# Patient Record
Sex: FEMALE | Race: WHITE | NOT HISPANIC OR LATINO
[De-identification: names, ages, dates, MRNs, and addresses within clinical notes are randomized per-mention and may not be internally consistent; named-entity substitution may affect disease eponyms.]

---

## 2017-01-05 ENCOUNTER — APPOINTMENT (OUTPATIENT)
Dept: HEMATOLOGY ONCOLOGY | Facility: CLINIC | Age: 67
End: 2017-01-05

## 2017-01-05 ENCOUNTER — APPOINTMENT (OUTPATIENT)
Dept: INFUSION THERAPY | Facility: CLINIC | Age: 67
End: 2017-01-05

## 2017-01-05 VITALS
TEMPERATURE: 98.6 F | DIASTOLIC BLOOD PRESSURE: 79 MMHG | HEIGHT: 64 IN | BODY MASS INDEX: 22.2 KG/M2 | HEART RATE: 76 BPM | SYSTOLIC BLOOD PRESSURE: 141 MMHG | WEIGHT: 130 LBS

## 2017-01-17 ENCOUNTER — RX RENEWAL (OUTPATIENT)
Age: 67
End: 2017-01-17

## 2017-02-16 ENCOUNTER — APPOINTMENT (OUTPATIENT)
Dept: INFUSION THERAPY | Facility: CLINIC | Age: 67
End: 2017-02-16

## 2017-02-16 ENCOUNTER — APPOINTMENT (OUTPATIENT)
Dept: HEMATOLOGY ONCOLOGY | Facility: CLINIC | Age: 67
End: 2017-02-16

## 2017-02-16 VITALS
HEART RATE: 88 BPM | WEIGHT: 128 LBS | HEIGHT: 64 IN | SYSTOLIC BLOOD PRESSURE: 126 MMHG | DIASTOLIC BLOOD PRESSURE: 61 MMHG | RESPIRATION RATE: 14 BRPM | TEMPERATURE: 96.6 F | BODY MASS INDEX: 21.85 KG/M2

## 2017-02-16 RX ORDER — OXYCODONE 5 MG/1
5 TABLET ORAL EVERY 6 HOURS
Qty: 120 | Refills: 0 | Status: ACTIVE | COMMUNITY
Start: 2017-02-16 | End: 1900-01-01

## 2017-03-02 ENCOUNTER — APPOINTMENT (OUTPATIENT)
Dept: HEMATOLOGY ONCOLOGY | Facility: CLINIC | Age: 67
End: 2017-03-02

## 2017-03-02 ENCOUNTER — APPOINTMENT (OUTPATIENT)
Dept: INFUSION THERAPY | Facility: CLINIC | Age: 67
End: 2017-03-02

## 2017-03-16 ENCOUNTER — APPOINTMENT (OUTPATIENT)
Dept: INFUSION THERAPY | Facility: CLINIC | Age: 67
End: 2017-03-16

## 2017-03-16 ENCOUNTER — APPOINTMENT (OUTPATIENT)
Dept: HEMATOLOGY ONCOLOGY | Facility: CLINIC | Age: 67
End: 2017-03-16

## 2017-03-16 VITALS
RESPIRATION RATE: 14 BRPM | SYSTOLIC BLOOD PRESSURE: 152 MMHG | WEIGHT: 130 LBS | HEART RATE: 88 BPM | DIASTOLIC BLOOD PRESSURE: 69 MMHG | BODY MASS INDEX: 22.2 KG/M2 | TEMPERATURE: 97.2 F | HEIGHT: 64 IN

## 2017-03-30 ENCOUNTER — APPOINTMENT (OUTPATIENT)
Dept: INFUSION THERAPY | Facility: CLINIC | Age: 67
End: 2017-03-30

## 2017-03-30 ENCOUNTER — APPOINTMENT (OUTPATIENT)
Dept: HEMATOLOGY ONCOLOGY | Facility: CLINIC | Age: 67
End: 2017-03-30

## 2017-03-31 ENCOUNTER — RX RENEWAL (OUTPATIENT)
Age: 67
End: 2017-03-31

## 2017-04-13 ENCOUNTER — APPOINTMENT (OUTPATIENT)
Dept: HEMATOLOGY ONCOLOGY | Facility: CLINIC | Age: 67
End: 2017-04-13

## 2017-04-13 ENCOUNTER — APPOINTMENT (OUTPATIENT)
Dept: INFUSION THERAPY | Facility: CLINIC | Age: 67
End: 2017-04-13

## 2017-04-13 VITALS
RESPIRATION RATE: 14 BRPM | TEMPERATURE: 97.9 F | HEART RATE: 72 BPM | SYSTOLIC BLOOD PRESSURE: 152 MMHG | WEIGHT: 129 LBS | DIASTOLIC BLOOD PRESSURE: 72 MMHG | HEIGHT: 64 IN | BODY MASS INDEX: 22.02 KG/M2

## 2017-05-11 ENCOUNTER — APPOINTMENT (OUTPATIENT)
Dept: HEMATOLOGY ONCOLOGY | Facility: CLINIC | Age: 67
End: 2017-05-11

## 2017-05-11 ENCOUNTER — APPOINTMENT (OUTPATIENT)
Dept: INFUSION THERAPY | Facility: CLINIC | Age: 67
End: 2017-05-11

## 2017-05-11 VITALS
SYSTOLIC BLOOD PRESSURE: 152 MMHG | HEART RATE: 80 BPM | RESPIRATION RATE: 14 BRPM | DIASTOLIC BLOOD PRESSURE: 70 MMHG | TEMPERATURE: 99.5 F

## 2017-05-18 ENCOUNTER — RX RENEWAL (OUTPATIENT)
Age: 67
End: 2017-05-18

## 2017-06-05 ENCOUNTER — APPOINTMENT (OUTPATIENT)
Dept: HEMATOLOGY ONCOLOGY | Facility: CLINIC | Age: 67
End: 2017-06-05

## 2017-06-05 ENCOUNTER — OUTPATIENT (OUTPATIENT)
Dept: OUTPATIENT SERVICES | Facility: HOSPITAL | Age: 67
LOS: 1 days | Discharge: HOME | End: 2017-06-05

## 2017-06-05 ENCOUNTER — APPOINTMENT (OUTPATIENT)
Dept: INFUSION THERAPY | Facility: CLINIC | Age: 67
End: 2017-06-05

## 2017-06-05 VITALS
HEART RATE: 111 BPM | SYSTOLIC BLOOD PRESSURE: 161 MMHG | RESPIRATION RATE: 14 BRPM | DIASTOLIC BLOOD PRESSURE: 79 MMHG | TEMPERATURE: 99.3 F | HEIGHT: 64 IN

## 2017-06-05 VITALS — BODY MASS INDEX: 21.63 KG/M2 | WEIGHT: 126 LBS

## 2017-06-08 ENCOUNTER — APPOINTMENT (OUTPATIENT)
Dept: HEMATOLOGY ONCOLOGY | Facility: CLINIC | Age: 67
End: 2017-06-08

## 2017-06-20 ENCOUNTER — OUTPATIENT (OUTPATIENT)
Dept: OUTPATIENT SERVICES | Facility: HOSPITAL | Age: 67
LOS: 1 days | Discharge: HOME | End: 2017-06-20

## 2017-06-28 DIAGNOSIS — C50.919 MALIGNANT NEOPLASM OF UNSPECIFIED SITE OF UNSPECIFIED FEMALE BREAST: ICD-10-CM

## 2017-07-06 ENCOUNTER — APPOINTMENT (OUTPATIENT)
Dept: HEMATOLOGY ONCOLOGY | Facility: CLINIC | Age: 67
End: 2017-07-06

## 2017-07-06 ENCOUNTER — APPOINTMENT (OUTPATIENT)
Dept: INFUSION THERAPY | Facility: CLINIC | Age: 67
End: 2017-07-06

## 2017-07-07 DIAGNOSIS — C78.89 SECONDARY MALIGNANT NEOPLASM OF OTHER DIGESTIVE ORGANS: ICD-10-CM

## 2017-07-07 DIAGNOSIS — C79.51 SECONDARY MALIGNANT NEOPLASM OF BONE: ICD-10-CM

## 2017-07-07 DIAGNOSIS — Z85.3 PERSONAL HISTORY OF MALIGNANT NEOPLASM OF BREAST: ICD-10-CM

## 2017-07-18 ENCOUNTER — OTHER (OUTPATIENT)
Age: 67
End: 2017-07-18

## 2017-07-25 ENCOUNTER — RX RENEWAL (OUTPATIENT)
Age: 67
End: 2017-07-25

## 2017-08-03 ENCOUNTER — APPOINTMENT (OUTPATIENT)
Dept: INFUSION THERAPY | Facility: CLINIC | Age: 67
End: 2017-08-03

## 2017-08-03 ENCOUNTER — APPOINTMENT (OUTPATIENT)
Dept: HEMATOLOGY ONCOLOGY | Facility: CLINIC | Age: 67
End: 2017-08-03

## 2017-08-03 VITALS
RESPIRATION RATE: 14 BRPM | HEIGHT: 64 IN | HEART RATE: 91 BPM | TEMPERATURE: 98.9 F | DIASTOLIC BLOOD PRESSURE: 84 MMHG | SYSTOLIC BLOOD PRESSURE: 146 MMHG

## 2017-08-03 VITALS — WEIGHT: 125 LBS | BODY MASS INDEX: 21.46 KG/M2

## 2017-08-03 RX ORDER — SUCRALFATE 1 G/10ML
1 SUSPENSION ORAL
Refills: 0 | Status: DISCONTINUED | COMMUNITY
End: 2017-08-03

## 2017-08-03 RX ORDER — EVEROLIMUS 10 MG/1
10 TABLET ORAL
Qty: 28 | Refills: 1 | Status: DISCONTINUED | COMMUNITY
Start: 2017-05-18 | End: 2017-08-03

## 2017-08-16 ENCOUNTER — RX RENEWAL (OUTPATIENT)
Age: 67
End: 2017-08-16

## 2017-08-25 ENCOUNTER — OTHER (OUTPATIENT)
Age: 67
End: 2017-08-25

## 2017-08-25 DIAGNOSIS — Z00.00 ENCOUNTER FOR GENERAL ADULT MEDICAL EXAMINATION W/OUT ABNORMAL FINDINGS: ICD-10-CM

## 2017-08-31 ENCOUNTER — APPOINTMENT (OUTPATIENT)
Dept: INFUSION THERAPY | Facility: CLINIC | Age: 67
End: 2017-08-31

## 2017-09-06 ENCOUNTER — RX RENEWAL (OUTPATIENT)
Age: 67
End: 2017-09-06

## 2017-09-18 ENCOUNTER — RX RENEWAL (OUTPATIENT)
Age: 67
End: 2017-09-18

## 2017-09-21 ENCOUNTER — APPOINTMENT (OUTPATIENT)
Dept: HEMATOLOGY ONCOLOGY | Facility: CLINIC | Age: 67
End: 2017-09-21

## 2017-09-21 VITALS
RESPIRATION RATE: 14 BRPM | HEART RATE: 115 BPM | WEIGHT: 122 LBS | SYSTOLIC BLOOD PRESSURE: 165 MMHG | BODY MASS INDEX: 20.83 KG/M2 | HEIGHT: 64 IN | DIASTOLIC BLOOD PRESSURE: 88 MMHG | TEMPERATURE: 100 F

## 2017-09-28 ENCOUNTER — APPOINTMENT (OUTPATIENT)
Dept: HEMATOLOGY ONCOLOGY | Facility: CLINIC | Age: 67
End: 2017-09-28

## 2017-09-28 ENCOUNTER — OUTPATIENT (OUTPATIENT)
Dept: OUTPATIENT SERVICES | Facility: HOSPITAL | Age: 67
LOS: 1 days | Discharge: HOME | End: 2017-09-28

## 2017-09-28 ENCOUNTER — APPOINTMENT (OUTPATIENT)
Dept: INFUSION THERAPY | Facility: CLINIC | Age: 67
End: 2017-09-28

## 2017-09-28 VITALS
HEIGHT: 64 IN | BODY MASS INDEX: 21.17 KG/M2 | TEMPERATURE: 98 F | WEIGHT: 124 LBS | DIASTOLIC BLOOD PRESSURE: 70 MMHG | HEART RATE: 92 BPM | RESPIRATION RATE: 16 BRPM | SYSTOLIC BLOOD PRESSURE: 168 MMHG

## 2017-09-28 DIAGNOSIS — C79.51 SECONDARY MALIGNANT NEOPLASM OF BONE: ICD-10-CM

## 2017-09-28 DIAGNOSIS — C78.89 SECONDARY MALIGNANT NEOPLASM OF OTHER DIGESTIVE ORGANS: ICD-10-CM

## 2017-09-28 DIAGNOSIS — Z85.3 PERSONAL HISTORY OF MALIGNANT NEOPLASM OF BREAST: ICD-10-CM

## 2017-10-03 ENCOUNTER — OUTPATIENT (OUTPATIENT)
Dept: OUTPATIENT SERVICES | Facility: HOSPITAL | Age: 67
LOS: 1 days | Discharge: HOME | End: 2017-10-03

## 2017-10-11 ENCOUNTER — OTHER (OUTPATIENT)
Age: 67
End: 2017-10-11

## 2017-10-12 DIAGNOSIS — I97.2 POSTMASTECTOMY LYMPHEDEMA SYNDROME: ICD-10-CM

## 2017-10-16 ENCOUNTER — APPOINTMENT (OUTPATIENT)
Dept: HEMATOLOGY ONCOLOGY | Facility: CLINIC | Age: 67
End: 2017-10-16

## 2017-10-17 ENCOUNTER — APPOINTMENT (OUTPATIENT)
Dept: HEMATOLOGY ONCOLOGY | Facility: CLINIC | Age: 67
End: 2017-10-17

## 2017-10-17 RX ORDER — EXEMESTANE 25 MG/1
25 TABLET, FILM COATED ORAL DAILY
Qty: 90 | Refills: 3 | Status: DISCONTINUED | COMMUNITY
Start: 2017-06-15 | End: 2017-10-17

## 2017-10-17 RX ORDER — EVEROLIMUS 7.5 MG/1
7.5 TABLET ORAL
Qty: 30 | Refills: 0 | Status: DISCONTINUED | COMMUNITY
Start: 2017-01-05 | End: 2017-10-17

## 2017-10-26 ENCOUNTER — APPOINTMENT (OUTPATIENT)
Dept: HEMATOLOGY ONCOLOGY | Facility: CLINIC | Age: 67
End: 2017-10-26

## 2017-10-26 ENCOUNTER — APPOINTMENT (OUTPATIENT)
Dept: INFUSION THERAPY | Facility: CLINIC | Age: 67
End: 2017-10-26

## 2017-11-22 ENCOUNTER — APPOINTMENT (OUTPATIENT)
Dept: HEMATOLOGY ONCOLOGY | Facility: CLINIC | Age: 67
End: 2017-11-22

## 2017-11-22 ENCOUNTER — APPOINTMENT (OUTPATIENT)
Dept: INFUSION THERAPY | Facility: CLINIC | Age: 67
End: 2017-11-22

## 2017-11-22 VITALS
TEMPERATURE: 99.6 F | DIASTOLIC BLOOD PRESSURE: 72 MMHG | HEART RATE: 97 BPM | HEIGHT: 64 IN | RESPIRATION RATE: 14 BRPM | SYSTOLIC BLOOD PRESSURE: 156 MMHG

## 2017-11-22 VITALS — BODY MASS INDEX: 22.14 KG/M2 | WEIGHT: 129 LBS

## 2017-12-21 ENCOUNTER — APPOINTMENT (OUTPATIENT)
Dept: INFUSION THERAPY | Facility: CLINIC | Age: 67
End: 2017-12-21

## 2017-12-21 ENCOUNTER — APPOINTMENT (OUTPATIENT)
Dept: HEMATOLOGY ONCOLOGY | Facility: CLINIC | Age: 67
End: 2017-12-21

## 2017-12-21 VITALS
TEMPERATURE: 96.5 F | RESPIRATION RATE: 14 BRPM | DIASTOLIC BLOOD PRESSURE: 79 MMHG | BODY MASS INDEX: 20.66 KG/M2 | WEIGHT: 121 LBS | HEIGHT: 64 IN | HEART RATE: 82 BPM | SYSTOLIC BLOOD PRESSURE: 198 MMHG

## 2018-01-03 ENCOUNTER — OTHER (OUTPATIENT)
Age: 68
End: 2018-01-03

## 2018-01-10 ENCOUNTER — RX RENEWAL (OUTPATIENT)
Age: 68
End: 2018-01-10

## 2018-01-18 ENCOUNTER — APPOINTMENT (OUTPATIENT)
Dept: HEMATOLOGY ONCOLOGY | Facility: CLINIC | Age: 68
End: 2018-01-18

## 2018-01-18 ENCOUNTER — OUTPATIENT (OUTPATIENT)
Dept: OUTPATIENT SERVICES | Facility: HOSPITAL | Age: 68
LOS: 1 days | Discharge: HOME | End: 2018-01-18

## 2018-01-18 ENCOUNTER — APPOINTMENT (OUTPATIENT)
Dept: INFUSION THERAPY | Facility: CLINIC | Age: 68
End: 2018-01-18

## 2018-01-18 VITALS
DIASTOLIC BLOOD PRESSURE: 82 MMHG | HEIGHT: 64 IN | HEART RATE: 89 BPM | SYSTOLIC BLOOD PRESSURE: 190 MMHG | TEMPERATURE: 96.4 F | RESPIRATION RATE: 14 BRPM

## 2018-01-18 VITALS — WEIGHT: 125 LBS | BODY MASS INDEX: 21.46 KG/M2

## 2018-01-18 DIAGNOSIS — Z85.3 PERSONAL HISTORY OF MALIGNANT NEOPLASM OF BREAST: ICD-10-CM

## 2018-01-18 DIAGNOSIS — C79.51 SECONDARY MALIGNANT NEOPLASM OF BONE: ICD-10-CM

## 2018-01-18 DIAGNOSIS — C78.89 SECONDARY MALIGNANT NEOPLASM OF OTHER DIGESTIVE ORGANS: ICD-10-CM

## 2018-01-19 RX ORDER — MEGESTROL ACETATE 40 MG/ML
40 SUSPENSION ORAL DAILY
Qty: 300 | Refills: 0 | Status: ACTIVE | COMMUNITY
Start: 2018-01-19 | End: 1900-01-01

## 2018-01-23 ENCOUNTER — OUTPATIENT (OUTPATIENT)
Dept: OUTPATIENT SERVICES | Facility: HOSPITAL | Age: 68
LOS: 1 days | Discharge: HOME | End: 2018-01-23

## 2018-01-23 DIAGNOSIS — C80.1 MALIGNANT (PRIMARY) NEOPLASM, UNSPECIFIED: ICD-10-CM

## 2018-02-15 ENCOUNTER — APPOINTMENT (OUTPATIENT)
Dept: HEMATOLOGY ONCOLOGY | Facility: CLINIC | Age: 68
End: 2018-02-15

## 2018-02-15 ENCOUNTER — APPOINTMENT (OUTPATIENT)
Dept: INFUSION THERAPY | Facility: CLINIC | Age: 68
End: 2018-02-15

## 2018-02-15 VITALS
HEART RATE: 102 BPM | SYSTOLIC BLOOD PRESSURE: 161 MMHG | HEIGHT: 64 IN | TEMPERATURE: 99.5 F | BODY MASS INDEX: 24.07 KG/M2 | RESPIRATION RATE: 14 BRPM | DIASTOLIC BLOOD PRESSURE: 85 MMHG | WEIGHT: 141 LBS

## 2018-02-15 RX ORDER — DENOSUMAB 60 MG/ML
1.7 INJECTION SUBCUTANEOUS ONCE
Qty: 0 | Refills: 0 | Status: DISCONTINUED | OUTPATIENT
Start: 2018-02-15 | End: 2018-02-15

## 2018-02-15 RX ORDER — DENOSUMAB 60 MG/ML
120 INJECTION SUBCUTANEOUS ONCE
Qty: 0 | Refills: 0 | Status: COMPLETED | OUTPATIENT
Start: 2018-02-15 | End: 2018-03-15

## 2018-02-15 RX ORDER — DENOSUMAB 60 MG/ML
120 INJECTION SUBCUTANEOUS ONCE
Qty: 0 | Refills: 0 | Status: DISCONTINUED | OUTPATIENT
Start: 2018-02-15 | End: 2018-02-15

## 2018-02-15 RX ORDER — ALPRAZOLAM 0.5 MG/1
0.5 TABLET ORAL
Qty: 30 | Refills: 0 | Status: ACTIVE | COMMUNITY
Start: 2018-02-15 | End: 1900-01-01

## 2018-02-21 ENCOUNTER — OUTPATIENT (OUTPATIENT)
Dept: OUTPATIENT SERVICES | Facility: HOSPITAL | Age: 68
LOS: 1 days | Discharge: HOME | End: 2018-02-21

## 2018-02-21 DIAGNOSIS — C79.51 SECONDARY MALIGNANT NEOPLASM OF BONE: ICD-10-CM

## 2018-02-23 DIAGNOSIS — I97.2 POSTMASTECTOMY LYMPHEDEMA SYNDROME: ICD-10-CM

## 2018-03-15 ENCOUNTER — APPOINTMENT (OUTPATIENT)
Dept: INFUSION THERAPY | Facility: CLINIC | Age: 68
End: 2018-03-15

## 2018-03-15 ENCOUNTER — APPOINTMENT (OUTPATIENT)
Dept: HEMATOLOGY ONCOLOGY | Facility: CLINIC | Age: 68
End: 2018-03-15

## 2018-03-15 VITALS
SYSTOLIC BLOOD PRESSURE: 166 MMHG | HEART RATE: 102 BPM | WEIGHT: 141 LBS | DIASTOLIC BLOOD PRESSURE: 86 MMHG | BODY MASS INDEX: 24.07 KG/M2 | HEIGHT: 64 IN | TEMPERATURE: 97 F

## 2018-03-15 DIAGNOSIS — M54.9 DORSALGIA, UNSPECIFIED: ICD-10-CM

## 2018-03-15 RX ORDER — OMEPRAZOLE 40 MG/1
40 CAPSULE, DELAYED RELEASE ORAL
Qty: 90 | Refills: 1 | Status: ACTIVE | COMMUNITY
Start: 1900-01-01 | End: 1900-01-01

## 2018-03-15 RX ORDER — DENOSUMAB 60 MG/ML
120 INJECTION SUBCUTANEOUS ONCE
Qty: 0 | Refills: 0 | Status: COMPLETED | OUTPATIENT
Start: 2018-03-15 | End: 2018-03-15

## 2018-03-15 RX ADMIN — DENOSUMAB 120 MILLIGRAM(S): 60 INJECTION SUBCUTANEOUS at 13:57

## 2018-03-15 RX ADMIN — DENOSUMAB 120 MILLIGRAM(S): 60 INJECTION SUBCUTANEOUS at 11:26

## 2018-04-10 ENCOUNTER — APPOINTMENT (OUTPATIENT)
Dept: HEMATOLOGY ONCOLOGY | Facility: CLINIC | Age: 68
End: 2018-04-10

## 2018-04-10 ENCOUNTER — APPOINTMENT (OUTPATIENT)
Dept: INFUSION THERAPY | Facility: CLINIC | Age: 68
End: 2018-04-10

## 2018-04-10 VITALS
TEMPERATURE: 99.7 F | HEIGHT: 64 IN | SYSTOLIC BLOOD PRESSURE: 186 MMHG | HEART RATE: 96 BPM | WEIGHT: 144 LBS | DIASTOLIC BLOOD PRESSURE: 79 MMHG | BODY MASS INDEX: 24.59 KG/M2

## 2018-04-10 DIAGNOSIS — I89.0 LYMPHEDEMA, NOT ELSEWHERE CLASSIFIED: ICD-10-CM

## 2018-04-10 RX ORDER — ONDANSETRON 8 MG/1
8 TABLET ORAL EVERY 8 HOURS
Qty: 40 | Refills: 4 | Status: ACTIVE | COMMUNITY
Start: 2017-02-16 | End: 1900-01-01

## 2018-04-10 RX ORDER — DENOSUMAB 60 MG/ML
120 INJECTION SUBCUTANEOUS ONCE
Qty: 0 | Refills: 0 | Status: COMPLETED | OUTPATIENT
Start: 2018-04-10 | End: 2018-04-10

## 2018-04-10 RX ADMIN — DENOSUMAB 120 MILLIGRAM(S): 60 INJECTION SUBCUTANEOUS at 11:24

## 2018-04-13 PROBLEM — I89.0 LYMPHEDEMA OF ARM: Status: ACTIVE | Noted: 2017-09-22

## 2018-05-09 ENCOUNTER — APPOINTMENT (OUTPATIENT)
Dept: INFUSION THERAPY | Facility: CLINIC | Age: 68
End: 2018-05-09

## 2018-05-09 ENCOUNTER — APPOINTMENT (OUTPATIENT)
Dept: HEMATOLOGY ONCOLOGY | Facility: CLINIC | Age: 68
End: 2018-05-09

## 2018-05-09 VITALS — WEIGHT: 140 LBS | BODY MASS INDEX: 24.03 KG/M2

## 2018-05-09 VITALS
DIASTOLIC BLOOD PRESSURE: 81 MMHG | TEMPERATURE: 99.8 F | SYSTOLIC BLOOD PRESSURE: 172 MMHG | RESPIRATION RATE: 16 BRPM | HEIGHT: 64 IN | HEART RATE: 102 BPM

## 2018-05-09 RX ORDER — DENOSUMAB 60 MG/ML
120 INJECTION SUBCUTANEOUS ONCE
Qty: 0 | Refills: 0 | Status: COMPLETED | OUTPATIENT
Start: 2018-05-09 | End: 2018-05-09

## 2018-05-09 RX ADMIN — DENOSUMAB 120 MILLIGRAM(S): 60 INJECTION SUBCUTANEOUS at 11:06

## 2018-05-10 ENCOUNTER — APPOINTMENT (OUTPATIENT)
Dept: INFUSION THERAPY | Facility: CLINIC | Age: 68
End: 2018-05-10

## 2018-05-10 ENCOUNTER — APPOINTMENT (OUTPATIENT)
Dept: HEMATOLOGY ONCOLOGY | Facility: CLINIC | Age: 68
End: 2018-05-10

## 2018-05-18 ENCOUNTER — LABORATORY RESULT (OUTPATIENT)
Age: 68
End: 2018-05-18

## 2018-05-18 ENCOUNTER — OUTPATIENT (OUTPATIENT)
Dept: OUTPATIENT SERVICES | Facility: HOSPITAL | Age: 68
LOS: 1 days | Discharge: HOME | End: 2018-05-18

## 2018-05-18 DIAGNOSIS — C50.919 MALIGNANT NEOPLASM OF UNSPECIFIED SITE OF UNSPECIFIED FEMALE BREAST: ICD-10-CM

## 2018-06-06 ENCOUNTER — APPOINTMENT (OUTPATIENT)
Dept: INFUSION THERAPY | Facility: CLINIC | Age: 68
End: 2018-06-06

## 2018-06-06 ENCOUNTER — APPOINTMENT (OUTPATIENT)
Dept: HEMATOLOGY ONCOLOGY | Facility: CLINIC | Age: 68
End: 2018-06-06

## 2018-06-06 VITALS
HEIGHT: 64 IN | SYSTOLIC BLOOD PRESSURE: 183 MMHG | TEMPERATURE: 99.3 F | WEIGHT: 139 LBS | BODY MASS INDEX: 23.73 KG/M2 | DIASTOLIC BLOOD PRESSURE: 77 MMHG | HEART RATE: 103 BPM

## 2018-06-06 RX ORDER — SUCRALFATE 1 G/10ML
1 SUSPENSION ORAL
Refills: 0 | Status: ACTIVE | COMMUNITY

## 2018-06-06 RX ORDER — DENOSUMAB 60 MG/ML
120 INJECTION SUBCUTANEOUS ONCE
Qty: 0 | Refills: 0 | Status: COMPLETED | OUTPATIENT
Start: 2018-06-06 | End: 2018-06-06

## 2018-06-06 RX ADMIN — DENOSUMAB 120 MILLIGRAM(S): 60 INJECTION SUBCUTANEOUS at 11:41

## 2018-07-03 ENCOUNTER — APPOINTMENT (OUTPATIENT)
Dept: HEMATOLOGY ONCOLOGY | Facility: CLINIC | Age: 68
End: 2018-07-03

## 2018-07-03 ENCOUNTER — APPOINTMENT (OUTPATIENT)
Dept: INFUSION THERAPY | Facility: CLINIC | Age: 68
End: 2018-07-03

## 2018-07-03 VITALS
HEIGHT: 64 IN | SYSTOLIC BLOOD PRESSURE: 181 MMHG | DIASTOLIC BLOOD PRESSURE: 86 MMHG | TEMPERATURE: 98.6 F | HEART RATE: 115 BPM

## 2018-07-03 DIAGNOSIS — Z51.81 ENCOUNTER FOR THERAPEUTIC DRUG LVL MONITORING: ICD-10-CM

## 2018-07-03 DIAGNOSIS — Z79.899 ENCOUNTER FOR THERAPEUTIC DRUG LVL MONITORING: ICD-10-CM

## 2018-07-03 RX ORDER — DENOSUMAB 60 MG/ML
120 INJECTION SUBCUTANEOUS ONCE
Qty: 0 | Refills: 0 | Status: COMPLETED | OUTPATIENT
Start: 2018-07-03 | End: 2018-07-03

## 2018-07-03 RX ORDER — GABAPENTIN 300 MG/1
300 CAPSULE ORAL 3 TIMES DAILY
Qty: 180 | Refills: 2 | Status: ACTIVE | COMMUNITY
Start: 2017-02-16 | End: 1900-01-01

## 2018-07-03 RX ORDER — MEGESTROL ACETATE 40 MG/1
40 TABLET ORAL EVERY 6 HOURS
Qty: 120 | Refills: 2 | Status: ACTIVE | COMMUNITY
Start: 2018-01-18 | End: 1900-01-01

## 2018-07-03 RX ORDER — TAMOXIFEN CITRATE 20 MG/1
20 TABLET, FILM COATED ORAL
Qty: 90 | Refills: 1 | Status: DISCONTINUED | COMMUNITY
Start: 2017-10-17 | End: 2018-05-03

## 2018-07-03 RX ADMIN — DENOSUMAB 120 MILLIGRAM(S): 60 INJECTION SUBCUTANEOUS at 11:21

## 2018-07-05 ENCOUNTER — RX RENEWAL (OUTPATIENT)
Age: 68
End: 2018-07-05

## 2018-07-10 ENCOUNTER — OUTPATIENT (OUTPATIENT)
Dept: OUTPATIENT SERVICES | Facility: HOSPITAL | Age: 68
LOS: 1 days | Discharge: HOME | End: 2018-07-10

## 2018-07-10 DIAGNOSIS — N63.20 UNSPECIFIED LUMP IN THE LEFT BREAST, UNSPECIFIED QUADRANT: ICD-10-CM

## 2018-07-11 ENCOUNTER — RESULT REVIEW (OUTPATIENT)
Age: 68
End: 2018-07-11

## 2018-07-11 ENCOUNTER — OUTPATIENT (OUTPATIENT)
Dept: OUTPATIENT SERVICES | Facility: HOSPITAL | Age: 68
LOS: 1 days | Discharge: HOME | End: 2018-07-11

## 2018-07-12 LAB — SURGICAL PATHOLOGY STUDY: SIGNIFICANT CHANGE UP

## 2018-07-13 DIAGNOSIS — N63.20 UNSPECIFIED LUMP IN THE LEFT BREAST, UNSPECIFIED QUADRANT: ICD-10-CM

## 2018-07-13 DIAGNOSIS — R92.8 OTHER ABNORMAL AND INCONCLUSIVE FINDINGS ON DIAGNOSTIC IMAGING OF BREAST: ICD-10-CM

## 2018-07-20 ENCOUNTER — APPOINTMENT (OUTPATIENT)
Dept: BREAST CENTER | Facility: CLINIC | Age: 68
End: 2018-07-20
Payer: MEDICARE

## 2018-07-20 ENCOUNTER — LABORATORY RESULT (OUTPATIENT)
Age: 68
End: 2018-07-20

## 2018-07-20 ENCOUNTER — OUTPATIENT (OUTPATIENT)
Dept: OUTPATIENT SERVICES | Facility: HOSPITAL | Age: 68
LOS: 1 days | Discharge: HOME | End: 2018-07-20

## 2018-07-20 VITALS
HEART RATE: 97 BPM | HEIGHT: 64 IN | WEIGHT: 139 LBS | OXYGEN SATURATION: 98 % | SYSTOLIC BLOOD PRESSURE: 134 MMHG | BODY MASS INDEX: 23.73 KG/M2 | DIASTOLIC BLOOD PRESSURE: 70 MMHG

## 2018-07-20 PROCEDURE — 99203 OFFICE O/P NEW LOW 30 MIN: CPT

## 2018-07-20 PROCEDURE — 11100 BX SKIN SUBCUTANEOUS&/MUCOUS MEMBRANE 1 LESION: CPT

## 2018-07-23 DIAGNOSIS — N63.21 UNSPECIFIED LUMP IN THE LEFT BREAST, UPPER OUTER QUADRANT: ICD-10-CM

## 2018-07-23 DIAGNOSIS — C50.919 MALIGNANT NEOPLASM OF UNSPECIFIED SITE OF UNSPECIFIED FEMALE BREAST: ICD-10-CM

## 2018-07-27 ENCOUNTER — APPOINTMENT (OUTPATIENT)
Dept: BREAST CENTER | Facility: CLINIC | Age: 68
End: 2018-07-27
Payer: MEDICARE

## 2018-07-27 VITALS
WEIGHT: 139 LBS | OXYGEN SATURATION: 97 % | HEIGHT: 64 IN | SYSTOLIC BLOOD PRESSURE: 146 MMHG | HEART RATE: 82 BPM | BODY MASS INDEX: 23.73 KG/M2 | DIASTOLIC BLOOD PRESSURE: 84 MMHG

## 2018-07-27 DIAGNOSIS — C50.919 MALIGNANT NEOPLASM OF UNSPECIFIED SITE OF UNSPECIFIED FEMALE BREAST: ICD-10-CM

## 2018-07-27 PROCEDURE — 99212 OFFICE O/P EST SF 10 MIN: CPT

## 2018-08-02 ENCOUNTER — OUTPATIENT (OUTPATIENT)
Dept: OUTPATIENT SERVICES | Facility: HOSPITAL | Age: 68
LOS: 1 days | Discharge: HOME | End: 2018-08-02

## 2018-08-02 ENCOUNTER — APPOINTMENT (OUTPATIENT)
Dept: HEMATOLOGY ONCOLOGY | Facility: CLINIC | Age: 68
End: 2018-08-02

## 2018-08-02 ENCOUNTER — APPOINTMENT (OUTPATIENT)
Dept: INFUSION THERAPY | Facility: CLINIC | Age: 68
End: 2018-08-02

## 2018-08-02 VITALS
TEMPERATURE: 98.7 F | SYSTOLIC BLOOD PRESSURE: 152 MMHG | HEIGHT: 64 IN | HEART RATE: 96 BPM | DIASTOLIC BLOOD PRESSURE: 77 MMHG | RESPIRATION RATE: 16 BRPM | BODY MASS INDEX: 23.56 KG/M2 | WEIGHT: 138 LBS

## 2018-08-02 DIAGNOSIS — C78.89 SECONDARY MALIGNANT NEOPLASM OF OTHER DIGESTIVE ORGANS: ICD-10-CM

## 2018-08-02 DIAGNOSIS — C79.51 SECONDARY MALIGNANT NEOPLASM OF BONE: ICD-10-CM

## 2018-08-02 DIAGNOSIS — Z85.3 PERSONAL HISTORY OF MALIGNANT NEOPLASM OF BREAST: ICD-10-CM

## 2018-08-02 RX ORDER — FULVESTRANT 50 MG/ML
500 INJECTION INTRAMUSCULAR ONCE
Qty: 0 | Refills: 0 | Status: COMPLETED | OUTPATIENT
Start: 2018-08-02 | End: 2018-08-02

## 2018-08-02 RX ORDER — DENOSUMAB 60 MG/ML
120 INJECTION SUBCUTANEOUS ONCE
Qty: 0 | Refills: 0 | Status: COMPLETED | OUTPATIENT
Start: 2018-08-02 | End: 2018-08-02

## 2018-08-02 RX ADMIN — DENOSUMAB 120 MILLIGRAM(S): 60 INJECTION SUBCUTANEOUS at 11:47

## 2018-08-02 RX ADMIN — FULVESTRANT 500 MILLIGRAM(S): 50 INJECTION INTRAMUSCULAR at 11:47

## 2018-08-03 ENCOUNTER — RX RENEWAL (OUTPATIENT)
Age: 68
End: 2018-08-03

## 2018-08-03 RX ORDER — ABEMACICLIB 150 MG/1
150 TABLET ORAL
Qty: 60 | Refills: 1 | Status: ACTIVE | COMMUNITY
Start: 2018-08-02 | End: 1900-01-01

## 2018-08-12 ENCOUNTER — INPATIENT (INPATIENT)
Facility: HOSPITAL | Age: 68
LOS: 1 days | End: 2018-08-14
Attending: INTERNAL MEDICINE | Admitting: INTERNAL MEDICINE

## 2018-08-12 VITALS
DIASTOLIC BLOOD PRESSURE: 79 MMHG | HEART RATE: 124 BPM | SYSTOLIC BLOOD PRESSURE: 156 MMHG | RESPIRATION RATE: 18 BRPM | OXYGEN SATURATION: 94 % | TEMPERATURE: 97 F

## 2018-08-12 DIAGNOSIS — C79.51 SECONDARY MALIGNANT NEOPLASM OF BONE: ICD-10-CM

## 2018-08-12 DIAGNOSIS — C79.81 SECONDARY MALIGNANT NEOPLASM OF BREAST: ICD-10-CM

## 2018-08-12 DIAGNOSIS — R00.0 TACHYCARDIA, UNSPECIFIED: ICD-10-CM

## 2018-08-12 DIAGNOSIS — G89.29 OTHER CHRONIC PAIN: ICD-10-CM

## 2018-08-12 DIAGNOSIS — R17 UNSPECIFIED JAUNDICE: ICD-10-CM

## 2018-08-12 DIAGNOSIS — R45.1 RESTLESSNESS AND AGITATION: ICD-10-CM

## 2018-08-12 DIAGNOSIS — R41.0 DISORIENTATION, UNSPECIFIED: ICD-10-CM

## 2018-08-12 DIAGNOSIS — E86.0 DEHYDRATION: ICD-10-CM

## 2018-08-12 DIAGNOSIS — R53.1 WEAKNESS: ICD-10-CM

## 2018-08-12 DIAGNOSIS — Z90.89 ACQUIRED ABSENCE OF OTHER ORGANS: Chronic | ICD-10-CM

## 2018-08-12 DIAGNOSIS — C78.89 SECONDARY MALIGNANT NEOPLASM OF OTHER DIGESTIVE ORGANS: ICD-10-CM

## 2018-08-12 DIAGNOSIS — Z87.81 PERSONAL HISTORY OF (HEALED) TRAUMATIC FRACTURE: Chronic | ICD-10-CM

## 2018-08-12 DIAGNOSIS — J80 ACUTE RESPIRATORY DISTRESS SYNDROME: ICD-10-CM

## 2018-08-12 DIAGNOSIS — C79.89 SECONDARY MALIGNANT NEOPLASM OF OTHER SPECIFIED SITES: ICD-10-CM

## 2018-08-12 DIAGNOSIS — Z87.81 PERSONAL HISTORY OF (HEALED) TRAUMATIC FRACTURE: ICD-10-CM

## 2018-08-12 DIAGNOSIS — Z85.118 PERSONAL HISTORY OF OTHER MALIGNANT NEOPLASM OF BRONCHUS AND LUNG: ICD-10-CM

## 2018-08-12 DIAGNOSIS — Z92.21 PERSONAL HISTORY OF ANTINEOPLASTIC CHEMOTHERAPY: ICD-10-CM

## 2018-08-12 DIAGNOSIS — Z80.0 FAMILY HISTORY OF MALIGNANT NEOPLASM OF DIGESTIVE ORGANS: ICD-10-CM

## 2018-08-12 DIAGNOSIS — Z80.1 FAMILY HISTORY OF MALIGNANT NEOPLASM OF TRACHEA, BRONCHUS AND LUNG: ICD-10-CM

## 2018-08-12 DIAGNOSIS — Z88.2 ALLERGY STATUS TO SULFONAMIDES: ICD-10-CM

## 2018-08-12 DIAGNOSIS — G93.40 ENCEPHALOPATHY, UNSPECIFIED: ICD-10-CM

## 2018-08-12 DIAGNOSIS — D59.9 ACQUIRED HEMOLYTIC ANEMIA, UNSPECIFIED: ICD-10-CM

## 2018-08-12 DIAGNOSIS — I27.20 PULMONARY HYPERTENSION, UNSPECIFIED: ICD-10-CM

## 2018-08-12 DIAGNOSIS — D69.6 THROMBOCYTOPENIA, UNSPECIFIED: ICD-10-CM

## 2018-08-12 DIAGNOSIS — J81.1 CHRONIC PULMONARY EDEMA: ICD-10-CM

## 2018-08-12 DIAGNOSIS — M31.1 THROMBOTIC MICROANGIOPATHY: ICD-10-CM

## 2018-08-12 LAB
ALBUMIN SERPL ELPH-MCNC: 4.1 G/DL — SIGNIFICANT CHANGE UP (ref 3.5–5.2)
ALP SERPL-CCNC: 84 U/L — SIGNIFICANT CHANGE UP (ref 30–115)
ALT FLD-CCNC: 19 U/L — SIGNIFICANT CHANGE UP (ref 0–41)
AMMONIA BLD-MCNC: 22 UMOL/L — SIGNIFICANT CHANGE UP (ref 11–55)
ANION GAP SERPL CALC-SCNC: 18 MMOL/L — HIGH (ref 7–14)
ANISOCYTOSIS BLD QL: SLIGHT — SIGNIFICANT CHANGE UP
APTT BLD: 27.6 SEC — SIGNIFICANT CHANGE UP (ref 27–39.2)
AST SERPL-CCNC: 66 U/L — HIGH (ref 0–41)
BASE EXCESS BLDV CALC-SCNC: -3 MMOL/L — LOW (ref -2–2)
BASOPHILS # BLD AUTO: 0 K/UL — SIGNIFICANT CHANGE UP (ref 0–0.2)
BASOPHILS # BLD AUTO: 0 K/UL — SIGNIFICANT CHANGE UP (ref 0–0.2)
BASOPHILS NFR BLD AUTO: 0 % — SIGNIFICANT CHANGE UP (ref 0–1)
BASOPHILS NFR BLD AUTO: 0 % — SIGNIFICANT CHANGE UP (ref 0–1)
BILIRUB DIRECT SERPL-MCNC: 0.5 MG/DL — HIGH (ref 0–0.2)
BILIRUB INDIRECT FLD-MCNC: 10.2 MG/DL — HIGH (ref 0.2–1.2)
BILIRUB SERPL-MCNC: 10.7 MG/DL — HIGH (ref 0.2–1.2)
BLD GP AB SCN SERPL QL: SIGNIFICANT CHANGE UP
BUN SERPL-MCNC: 30 MG/DL — HIGH (ref 10–20)
CA-I SERPL-SCNC: 1.07 MMOL/L — LOW (ref 1.12–1.3)
CALCIUM SERPL-MCNC: 8.5 MG/DL — SIGNIFICANT CHANGE UP (ref 8.5–10.1)
CHLORIDE SERPL-SCNC: 105 MMOL/L — SIGNIFICANT CHANGE UP (ref 98–110)
CK MB CFR SERPL CALC: 1.7 NG/ML — SIGNIFICANT CHANGE UP (ref 0.6–6.3)
CK SERPL-CCNC: 120 U/L — SIGNIFICANT CHANGE UP (ref 0–225)
CO2 SERPL-SCNC: 19 MMOL/L — SIGNIFICANT CHANGE UP (ref 17–32)
CREAT SERPL-MCNC: 1 MG/DL — SIGNIFICANT CHANGE UP (ref 0.7–1.5)
D DIMER BLD IA.RAPID-MCNC: 640 NG/ML DDU — HIGH (ref 0–230)
EOSINOPHIL # BLD AUTO: 0 K/UL — SIGNIFICANT CHANGE UP (ref 0–0.7)
EOSINOPHIL # BLD AUTO: 0 K/UL — SIGNIFICANT CHANGE UP (ref 0–0.7)
EOSINOPHIL NFR BLD AUTO: 0 % — SIGNIFICANT CHANGE UP (ref 0–8)
EOSINOPHIL NFR BLD AUTO: 0 % — SIGNIFICANT CHANGE UP (ref 0–8)
FIBRINOGEN PPP-MCNC: 346 MG/DL — SIGNIFICANT CHANGE UP (ref 204.4–570.6)
GAS PNL BLDV: 143 MMOL/L — SIGNIFICANT CHANGE UP (ref 136–145)
GAS PNL BLDV: SIGNIFICANT CHANGE UP
GLUCOSE SERPL-MCNC: 117 MG/DL — HIGH (ref 70–99)
HCO3 BLDV-SCNC: 21 MMOL/L — LOW (ref 22–29)
HCT VFR BLD CALC: 18.2 % — LOW (ref 37–47)
HCT VFR BLD CALC: 18.3 % — LOW (ref 37–47)
HCT VFR BLDA CALC: 24.4 % — LOW (ref 34–44)
HGB BLD CALC-MCNC: 8 G/DL — LOW (ref 14–18)
HGB BLD-MCNC: 6.2 G/DL — CRITICAL LOW (ref 12–16)
HGB BLD-MCNC: 6.2 G/DL — CRITICAL LOW (ref 12–16)
HYPOCHROMIA BLD QL: SLIGHT — SIGNIFICANT CHANGE UP
INR BLD: 1.41 RATIO — HIGH (ref 0.65–1.3)
LACTATE BLDV-MCNC: 1.6 MMOL/L — SIGNIFICANT CHANGE UP (ref 0.5–1.6)
LACTATE SERPL-SCNC: 1.7 MMOL/L — SIGNIFICANT CHANGE UP (ref 0.5–2.2)
LDH SERPL L TO P-CCNC: 1057 — HIGH (ref 50–242)
LIDOCAIN IGE QN: 32 U/L — SIGNIFICANT CHANGE UP (ref 7–60)
LYMPHOCYTES # BLD AUTO: 0.89 K/UL — LOW (ref 1.2–3.4)
LYMPHOCYTES # BLD AUTO: 1.02 K/UL — LOW (ref 1.2–3.4)
LYMPHOCYTES # BLD AUTO: 11 % — LOW (ref 20.5–51.1)
LYMPHOCYTES # BLD AUTO: 12 % — LOW (ref 20.5–51.1)
MAGNESIUM SERPL-MCNC: 2.2 MG/DL — SIGNIFICANT CHANGE UP (ref 1.8–2.4)
MCHC RBC-ENTMCNC: 32.1 PG — HIGH (ref 27–31)
MCHC RBC-ENTMCNC: 33 PG — HIGH (ref 27–31)
MCHC RBC-ENTMCNC: 33.9 G/DL — SIGNIFICANT CHANGE UP (ref 32–37)
MCHC RBC-ENTMCNC: 34.1 G/DL — SIGNIFICANT CHANGE UP (ref 32–37)
MCV RBC AUTO: 94.8 FL — SIGNIFICANT CHANGE UP (ref 81–99)
MCV RBC AUTO: 96.8 FL — SIGNIFICANT CHANGE UP (ref 81–99)
MONOCYTES # BLD AUTO: 0.51 K/UL — SIGNIFICANT CHANGE UP (ref 0.1–0.6)
MONOCYTES # BLD AUTO: 0.57 K/UL — SIGNIFICANT CHANGE UP (ref 0.1–0.6)
MONOCYTES NFR BLD AUTO: 6 % — SIGNIFICANT CHANGE UP (ref 1.7–9.3)
MONOCYTES NFR BLD AUTO: 7 % — SIGNIFICANT CHANGE UP (ref 1.7–9.3)
NEUTROPHILS # BLD AUTO: 6.63 K/UL — HIGH (ref 1.4–6.5)
NEUTROPHILS # BLD AUTO: 6.95 K/UL — HIGH (ref 1.4–6.5)
NEUTROPHILS NFR BLD AUTO: 82 % — HIGH (ref 42.2–75.2)
NEUTROPHILS NFR BLD AUTO: 82 % — HIGH (ref 42.2–75.2)
NRBC # BLD: 0 /100 — SIGNIFICANT CHANGE UP (ref 0–0)
NRBC # BLD: SIGNIFICANT CHANGE UP /100 WBCS (ref 0–0)
PCO2 BLDV: 32 MMHG — LOW (ref 41–51)
PH BLDV: 7.43 — SIGNIFICANT CHANGE UP (ref 7.26–7.43)
PLAT MORPH BLD: NORMAL — SIGNIFICANT CHANGE UP
PLATELET # BLD AUTO: 75 K/UL — LOW (ref 130–400)
PLATELET # BLD AUTO: 83 K/UL — LOW (ref 130–400)
PLATELET COUNT - ESTIMATE: ABNORMAL
PO2 BLDV: 38 MMHG — SIGNIFICANT CHANGE UP (ref 20–40)
POIKILOCYTOSIS BLD QL AUTO: SLIGHT — SIGNIFICANT CHANGE UP
POLYCHROMASIA BLD QL SMEAR: SLIGHT — SIGNIFICANT CHANGE UP
POTASSIUM BLDV-SCNC: 4.4 MMOL/L — SIGNIFICANT CHANGE UP (ref 3.3–5.6)
POTASSIUM SERPL-MCNC: 4.3 MMOL/L — SIGNIFICANT CHANGE UP (ref 3.5–5)
POTASSIUM SERPL-SCNC: 4.3 MMOL/L — SIGNIFICANT CHANGE UP (ref 3.5–5)
PROT SERPL-MCNC: 6.2 G/DL — SIGNIFICANT CHANGE UP (ref 6–8)
PROTHROM AB SERPL-ACNC: 15.2 SEC — HIGH (ref 9.95–12.87)
RBC # BLD: 1.87 M/UL — LOW (ref 4.2–5.4)
RBC # BLD: 1.88 M/UL — LOW (ref 4.2–5.4)
RBC # BLD: 1.93 M/UL — LOW (ref 4.2–5.4)
RBC # FLD: 23.9 % — HIGH (ref 11.5–14.5)
RBC # FLD: 24.9 % — HIGH (ref 11.5–14.5)
RBC BLD AUTO: ABNORMAL
RETICS #: 300.5 K/UL — HIGH (ref 25–125)
RETICS/RBC NFR: 16.1 % — HIGH (ref 0.5–1.5)
SAO2 % BLDV: 75 % — SIGNIFICANT CHANGE UP
SCHISTOCYTES BLD QL AUTO: SLIGHT — SIGNIFICANT CHANGE UP
SODIUM SERPL-SCNC: 142 MMOL/L — SIGNIFICANT CHANGE UP (ref 135–146)
TROPONIN T SERPL-MCNC: 0.02 NG/ML — HIGH
TYPE + AB SCN PNL BLD: SIGNIFICANT CHANGE UP
WBC # BLD: 8.09 K/UL — SIGNIFICANT CHANGE UP (ref 4.8–10.8)
WBC # BLD: 8.48 K/UL — SIGNIFICANT CHANGE UP (ref 4.8–10.8)
WBC # FLD AUTO: 8.09 K/UL — SIGNIFICANT CHANGE UP (ref 4.8–10.8)
WBC # FLD AUTO: 8.48 K/UL — SIGNIFICANT CHANGE UP (ref 4.8–10.8)

## 2018-08-12 RX ORDER — ONDANSETRON 8 MG/1
1 TABLET, FILM COATED ORAL
Qty: 0 | Refills: 0 | COMMUNITY

## 2018-08-12 RX ORDER — LABETALOL HCL 100 MG
5 TABLET ORAL ONCE
Qty: 0 | Refills: 0 | Status: COMPLETED | OUTPATIENT
Start: 2018-08-12 | End: 2018-08-12

## 2018-08-12 RX ORDER — OXYBUTYNIN CHLORIDE 5 MG
1 TABLET ORAL
Qty: 0 | Refills: 0 | COMMUNITY

## 2018-08-12 RX ORDER — ALPRAZOLAM 0.25 MG
1 TABLET ORAL
Qty: 0 | Refills: 0 | COMMUNITY

## 2018-08-12 RX ORDER — FENTANYL CITRATE 50 UG/ML
1 INJECTION INTRAVENOUS
Qty: 0 | Refills: 0 | Status: DISCONTINUED | OUTPATIENT
Start: 2018-08-12 | End: 2018-08-13

## 2018-08-12 RX ORDER — GABAPENTIN 400 MG/1
600 CAPSULE ORAL THREE TIMES A DAY
Qty: 0 | Refills: 0 | Status: DISCONTINUED | OUTPATIENT
Start: 2018-08-12 | End: 2018-08-14

## 2018-08-12 RX ORDER — PANTOPRAZOLE SODIUM 20 MG/1
40 TABLET, DELAYED RELEASE ORAL
Qty: 0 | Refills: 0 | Status: DISCONTINUED | OUTPATIENT
Start: 2018-08-12 | End: 2018-08-13

## 2018-08-12 RX ORDER — CALCIUM GLUCONATE 100 MG/ML
2 VIAL (ML) INTRAVENOUS ONCE
Qty: 0 | Refills: 0 | Status: COMPLETED | OUTPATIENT
Start: 2018-08-12 | End: 2018-08-13

## 2018-08-12 RX ORDER — FENTANYL CITRATE 50 UG/ML
1 INJECTION INTRAVENOUS
Qty: 0 | Refills: 0 | Status: DISCONTINUED | OUTPATIENT
Start: 2018-08-12 | End: 2018-08-12

## 2018-08-12 RX ORDER — GABAPENTIN 400 MG/1
1 CAPSULE ORAL
Qty: 0 | Refills: 0 | COMMUNITY

## 2018-08-12 RX ORDER — CALCIUM GLUCONATE 100 MG/ML
1 VIAL (ML) INTRAVENOUS ONCE
Qty: 0 | Refills: 0 | Status: DISCONTINUED | OUTPATIENT
Start: 2018-08-12 | End: 2018-08-12

## 2018-08-12 RX ORDER — SODIUM CHLORIDE 9 MG/ML
1000 INJECTION INTRAMUSCULAR; INTRAVENOUS; SUBCUTANEOUS ONCE
Qty: 0 | Refills: 0 | Status: COMPLETED | OUTPATIENT
Start: 2018-08-12 | End: 2018-08-12

## 2018-08-12 RX ORDER — FENTANYL CITRATE 50 UG/ML
125 INJECTION INTRAVENOUS
Qty: 0 | Refills: 0 | COMMUNITY

## 2018-08-12 RX ORDER — PANTOPRAZOLE SODIUM 20 MG/1
40 TABLET, DELAYED RELEASE ORAL
Qty: 0 | Refills: 0 | COMMUNITY

## 2018-08-12 RX ORDER — FENTANYL CITRATE 50 UG/ML
1 INJECTION INTRAVENOUS ONCE
Qty: 0 | Refills: 0 | Status: DISCONTINUED | OUTPATIENT
Start: 2018-08-12 | End: 2018-08-12

## 2018-08-12 RX ORDER — ONDANSETRON 8 MG/1
4 TABLET, FILM COATED ORAL ONCE
Qty: 0 | Refills: 0 | Status: COMPLETED | OUTPATIENT
Start: 2018-08-12 | End: 2018-08-12

## 2018-08-12 RX ORDER — DIPHENHYDRAMINE HCL 50 MG
50 CAPSULE ORAL ONCE
Qty: 0 | Refills: 0 | Status: COMPLETED | OUTPATIENT
Start: 2018-08-12 | End: 2018-08-13

## 2018-08-12 RX ORDER — OXYBUTYNIN CHLORIDE 5 MG
5 TABLET ORAL
Qty: 0 | Refills: 0 | Status: DISCONTINUED | OUTPATIENT
Start: 2018-08-12 | End: 2018-08-14

## 2018-08-12 RX ORDER — OXYBUTYNIN CHLORIDE 5 MG
10 TABLET ORAL DAILY
Qty: 0 | Refills: 0 | Status: DISCONTINUED | OUTPATIENT
Start: 2018-08-12 | End: 2018-08-12

## 2018-08-12 RX ADMIN — Medication 1 MILLIGRAM(S): at 18:29

## 2018-08-12 RX ADMIN — Medication 1 MILLIGRAM(S): at 20:45

## 2018-08-12 RX ADMIN — ONDANSETRON 4 MILLIGRAM(S): 8 TABLET, FILM COATED ORAL at 14:23

## 2018-08-12 RX ADMIN — FENTANYL CITRATE 1 PATCH: 50 INJECTION INTRAVENOUS at 17:10

## 2018-08-12 RX ADMIN — Medication 2 MILLIGRAM(S): at 15:18

## 2018-08-12 RX ADMIN — SODIUM CHLORIDE 2000 MILLILITER(S): 9 INJECTION INTRAMUSCULAR; INTRAVENOUS; SUBCUTANEOUS at 14:23

## 2018-08-12 NOTE — H&P ADULT - ASSESSMENT
Patient is a 69 yo F with PMHx of Metastatic Hormone receptor positive, HER 2 tuyet negative breast cancer with known mets to the spine, Lymph nodes, left breast mass, and stomach follows with Dr. Brink for cancer treatment presenting with acute change in mental status admitted for acute hemolytic anemia in the setting of likely TTP.    #) Acute Hemolytic Anemia with AMS   - Heme Onc on board  - Will hold off on transfusion at this time given it could worsen symptoms; If patient shows active signs of bleeding (which are not present at this time), will transfuse appropriately   - UDall placed by surgery   - Plasmapheresis arranged with 3.5 L of FFP  - Retic count, haptoglobin, krystal, and ADAMTS 13 drawn and pending   - Daily cbc, bmp and LFTs, pt/ptt, retic count, LDH , haptoglobin     #) Metastatic Breast Cancer  - Hold off and restart chemotherapy as indicated by heme onc    #) Chronic Pain  - patient on 125mcg of Fentanyl patches, family has their own method of applying q48h  - Please be weary of potential withdrawal effects  - Any additional concerns can be directed to pain management physician, Dr. Fraga     #) Anxiety  - Patient takes Xanax 0.5mg as needed at home, hold off for now given acute agitation   - Can provided ativan as needed     DVT: SCDs for now   GI PPx: Protonix 40mg BID (home dose)

## 2018-08-12 NOTE — ED ADULT NURSE REASSESSMENT NOTE - NS ED NURSE REASSESS COMMENT FT1
Patient attempting to pull out [place] cath, crying, agitated and attempting to get out of bed screaming "help me, I have to get out of here." 1mg Ativan IVP given as per order. Unable to keep patient on cardiac monitor at this time patient continuously pulls off leads.  Family at bedside. Fall precautions maintained

## 2018-08-12 NOTE — ED ADULT NURSE NOTE - NSIMPLEMENTINTERV_GEN_ALL_ED
Implemented All Fall Risk Interventions:  [place] to call system. Call bell, personal items and telephone within reach. Instruct patient to call for assistance. Room bathroom lighting operational. Non-slip footwear when patient is off stretcher. Physically safe environment: no spills, clutter or unnecessary equipment. Stretcher in lowest position, wheels locked, appropriate side rails in place. Provide visual cue, wrist band, yellow gown, etc. Monitor gait and stability. Monitor for mental status changes and reorient to person, place, and time. Review medications for side effects contributing to fall risk. Reinforce activity limits and safety measures with patient and family.

## 2018-08-12 NOTE — ED PROVIDER NOTE - CRITICAL CARE PROVIDED
consult w/ pt's family directly relating to pts condition/direct patient care (not related to procedure)/interpretation of diagnostic studies/documentation/consultation with other physicians/additional history taking

## 2018-08-12 NOTE — ED ADULT NURSE REASSESSMENT NOTE - NS ED NURSE REASSESS COMMENT FT1
L low back Fentanyl patch 100mcg home dose that patient arrived to ED with removed and discarded, 2 RNs witnessed. New 100mcg Fentanyl patch replaced to R low back covered with Tegaderm initialed, dated and timed. As per family patient replaces 1000mcg path Q 48 hours. Along with a 25mcg patch that was just initiated at home by family this morning and not due for replacement at this time L low back Fentanyl patch 100mcg home dose that patient arrived to ED with removed and discarded, 2 RNs witnessed. New 100mcg Fentanyl patch replaced to R low back covered with Tegaderm initialed, dated and timed. As per family patient replaces 100mcg path Q 48 hours. Along with a 25mcg patch that was just initiated at home by family this morning and not due for replacement at this time

## 2018-08-12 NOTE — ED PROVIDER NOTE - CARE PLAN
Principal Discharge DX:	Acute hemolytic anemia  Secondary Diagnosis:	Thrombocytopenia  Secondary Diagnosis:	Jaundice

## 2018-08-12 NOTE — CONSULT NOTE ADULT - SUBJECTIVE AND OBJECTIVE BOX
Patient is a 67 yo F with PMHx of Metastatic Hormone receptor positive, HER 2 tuyet negative breast cancer with known mets to the spine, Lymph nodes, left breast mass, and stomach follows with Dr. Brink for cancer treatment. Per  and son and outpatient documentation, patient underwent an ultrasound with punch biopsy which showed evidence of disease progression. Patient followed was recently started on fourth line therapy known as Abemaciclib 150mg BID in combination with Fulvestrant. Per family, patient took her first dose on Friday and Saturday PTA. On Friday, they noted that patient had faint yellowing of skin with some anxiety. By Saturday she was noted to have altered mental status, mainly incoherent, repeating her words, nonsensical. They were concerned about her abrupt change in mentation so she was brought to the hospital for further evaluation. Per son, patient otherwise with no fever, occasional chills, no cough, wheezing, no diarrhea, no chest pain or SOB.     Past Medical History/ Surgical History:  ACUTE HEMOLYTIC ANEMIA; THROMBOCYTOPENIA; JAUNDICE  Acute hemolytic anemia  History of tonsillectomy  History of fracture of femur  Lung cancer  Jaundice  Thrombocytopenia  Metastatic breast ca her 2 neg    Allergies:erythromycin (Unknown)  sulfa drugs (Unknown)  tetracycline (Unknown)    Medications:fentaNYL: 125 microgram(s) transdermal every 72 hours  gabapentin 600 mg oral tablet: 1 tab(s) orally 3 times a day  Protonix: 40 milligram(s) orally 2 times a day  fentaNYL   Patch 100 MICROgram(s)/Hr. 1 Patch Transdermal every 48 hours  labetalol Injectable 5 milliGRAM(s) IV Push once  LORazepam   Injectable 1 milliGRAM(s) IntraMuscular once      Physical Exam:  Vitals:T(C): 35.9 (18 @ 16:01), Max: 36.2 (18 @ 13:39)  HR: 121 (18 @ 16:01) (121 - 124)  BP: 123/61 (18 @ 16:01) (123/61 - 156/79)  RR: 18 (18 @ 16:01) (18 - 18)  SpO2: 95% (18 @ 16:01) (94% - 95%)      General: Alert and oriented times 3 , Not in acute distress   Heart: Regular rate and rhythm , no rubs murmurs or gallops  Lungs: Clear to auscultation , no wheezes , rales rhonci or adventicious breath sounds  Abdomen: Soft , positive bowel sounds, non tender, non distended, no peritoneal signs               6.2    8.09  )-----------( 83       (  @ 14:22 )             18.3                    142   |  105   |  30                 Ca: 8.5    BMP:   ----------------------------< 117    M.2   (18 @ 14:22)             4.3    |  19    | 1.0                Ph: x        LFT:     TPro: 6.2 / Alb: 4.1 / TBili: 10.7 / DBili: 0.5 / AST: 66 / ALT: 19 / AlkPhos: 84   (18 @ 14:22)          PT/INR - ( 12 Aug 2018 14:22 )   PT: 15.20 sec;   INR: 1.41 ratio         PTT - ( 12 Aug 2018 14:22 )  PTT:27.6 sec

## 2018-08-12 NOTE — CHART NOTE - NSCHARTNOTEFT_GEN_A_CORE
Patient initially admitted under oncology but given need for Plasmapheresis with close monitoring, approval for ICU given. Sign out given to Senior on Call Dr. Suarez and intern on call Dr. Wilson

## 2018-08-12 NOTE — ED PROVIDER NOTE - PSYCHIATRIC, MLM
Alert and oriented to person, place, time/situation. anxious, at times crying and needs to be redirected

## 2018-08-12 NOTE — H&P ADULT - NSHPLABSRESULTS_GEN_ALL_CORE
6.2    8.09   )----------(  83        ( 12 Aug 2018 14:22 )               18.3    08-12    142  |  105  |  30<H>  ----------------------------<  117<H>  4.3   |  19  |  1.0    Ca    8.5      12 Aug 2018 14:22  Mg     2.2     08-12    TPro  6.2  /  Alb  4.1  /  TBili  10.7<H>  /  DBili  0.5<H>  /  AST  66<H>  /  ALT  19  /  AlkPhos  84  08-12    PT/INR -  15.20 sec / 1.41 ratio   ( 12 Aug 2018 14:22 )       PTT -  27.6 sec   ( 12 Aug 2018 14:22 )    < from: CT Head No Cont (08.12.18 @ 17:05) >    No evidence of acute intracranial pathology.    < end of copied text >    < from: CT Abdomen and Pelvis w/ IV Cont (08.12.18 @ 17:06) >    ew bilateral small pleural effusions.    Unchanged sclerotic and lytic osseous metastases as above.    Otherwise, no evidence of acute intra-abdominal or pelvic pathology.  I have reviewed the above preliminary report with the following   comment-there are skin thickening involving the left breast presumably   representing a malignant process.    < end of copied text >    < from: US Abdomen Limited (08.12.18 @ 16:54) >    No sonographic evidence for acute cholecystitis.    Right pleural effusion.    < end of copied text >

## 2018-08-12 NOTE — CONSULT NOTE ADULT - SUBJECTIVE AND OBJECTIVE BOX
HPI: 68 yrs old female patient with metastatic breast Ca HR positive and Her2 negative with spine, gastric, soft tissue and stomach metastasis,  is here for worsening confusion , and jaundice .   Patient was  previously been treated with Faslodex and Femara, Ibrance and Femara, Affinitor and exemestane, Tamoxifen and most recently Megace.  In July, she underwent breast imaging which revealed a breast mass. 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   She was been very hesitant to go on cytotoxic chemotherapy. She agreed on trying Abemaciclib with Faslodex. Abemaciclib 150mg twice daily was started 3 days ago in combination with faslodex. 2 days ago, family noticed that she is becoming more confused , delirious and agitated , with a yellow colored skin . No fever, chills, no respiratory , GI or urinary symptoms.  Note that prior to this event, patient joined a program where she has been taking classes all day and she is able to manage well.  Her pain is well controlled with Duragesic and medical marijuana.      PAST MEDICAL & SURGICAL HISTORY: metastatic breast cancer  hypothyroidism  femur Fx  Pelvix Fx  D&C  Tonsillectomy        erythromycin (Unknown)  sulfa drugs (Unknown)  tetracycline (Unknown)    Intolerances    Weight (kg): 59 (08-12-18 @ 14:44)      HOME MEDICATIONS:      Vital Signs Last 24 Hrs  T(C): 35.9 (12 Aug 2018 16:01), Max: 36.2 (12 Aug 2018 13:39)  T(F): 96.6 (12 Aug 2018 16:01), Max: 97.2 (12 Aug 2018 13:39)  HR: 121 (12 Aug 2018 16:01) (121 - 124)  BP: 123/61 (12 Aug 2018 16:01) (123/61 - 156/79)  BP(mean): --  RR: 18 (12 Aug 2018 16:01) (18 - 18)  SpO2: 95% (12 Aug 2018 16:01) (94% - 95%)    PHYSICAL EXAM  General: adult confused, agitated  HEENT: clear oropharynx, icteric skin and sclera  Neck: supple  CV: tachycardiac  Lungs: decreased air entry in bases, unable to do a full breast exam  Abdomen: soft non-tender - Unable to palpate a spleen or liver because patient is very agitated  Ext: no clubbing cyanosis or edema  Skin: no rashes and no petechiae  Neuro: confused , agitated    MEDICATIONS  (STANDING):  fentaNYL   Patch 100 MICROgram(s)/Hr. 1 Patch Transdermal every 48 hours    MEDICATIONS  (PRN):      LABS:                          6.2    8.09  )-----------( 83       ( 12 Aug 2018 14:22 )             18.3         Mean Cell Volume : 94.8 fL  Mean Cell Hemoglobin : 32.1 pg  Mean Cell Hemoglobin Concentration : 33.9 g/dL  Auto Neutrophil # : 6.63 K/uL  Auto Lymphocyte # : 0.89 K/uL  Auto Monocyte # : 0.57 K/uL  Auto Eosinophil # : 0.00 K/uL  Auto Basophil # : 0.00 K/uL  Auto Neutrophil % : 82.0 %  Auto Lymphocyte % : 11.0 %  Auto Monocyte % : 7.0 %  Auto Eosinophil % : 0.0 %  Auto Basophil % : 0.0 %      Serial CBC's  08-12 @ 14:22  Hct-18.3 / Hgb-6.2 / Plat-83 / RBC-1.93 / WBC-8.09      08-12    142  |  105  |  30<H>  ----------------------------<  117<H>  4.3   |  19  |  1.0    Ca    8.5      12 Aug 2018 14:22  Mg     2.2     08-12    TPro  6.2  /  Alb  4.1  /  TBili  10.7<H>  /  DBili  0.5<H>  /  AST  66<H>  /  ALT  19  /  AlkPhos  84  08-12      PT/INR - ( 12 Aug 2018 14:22 )   PT: 15.20 sec;   INR: 1.41 ratio         PTT - ( 12 Aug 2018 14:22 )  PTT:27.6 sec      BLOOD SMEAR INTERPRETATION:   decreased platelet count , anisocytosis, hypochromina, polychromasia  Presence of shistocytes    RADIOLOGY & ADDITIONAL STUDIES:  < from: CT Abdomen and Pelvis w/ IV Cont (08.12.18 @ 17:06) >  IMPRESSION:        New bilateral small pleural effusions.    Unchanged sclerotic and lytic osseous metastases as above.    Otherwise, no evidence of acute intra-abdominal or pelvic pathology.      < end of copied text >    < from: CT Head No Cont (08.12.18 @ 17:05) >    IMPRESSION:    No evidence of acute intracranial pathology.    < end of copied text >    < from: US Abdomen Limited (08.12.18 @ 16:54) >  IMPRESSION:    No sonographic evidence for acute cholecystitis.    Right pleural effusion.          < end of copied text >

## 2018-08-12 NOTE — ED ADULT NURSE NOTE - OBJECTIVE STATEMENT
c/o weakness, nausea, jaundice, and periods of confusion for a few days. Patient has metastatic lung cancer to breast currently on chemotherapy. No active vomiting

## 2018-08-12 NOTE — H&P ADULT - NSHPPHYSICALEXAM_GEN_ALL_CORE
General: well developed, well nourished, agitated   Neuro: alert and oriented x 0, moves all extremities spontaneously  HEENT: NCAT, scleral icterus   Respiratory: CTABL  CVS: regular rate and rhythm  Abdomen: soft, nontender, nondistended  Extremities: no edema, sensation and movement grossly intact  Skin: diffuse jaundice

## 2018-08-12 NOTE — H&P ADULT - NSHPOUTPATIENTPROVIDERS_GEN_ALL_CORE
PCP: Dr. Martin  Oncologist: Dr. Brink PCP: Dr. Martin  Oncologist: Dr. Brikn  Pain Management: Dr. Fraga

## 2018-08-12 NOTE — ED PROVIDER NOTE - PROGRESS NOTE DETAILS
attending mdm: new onset jaundice with weakness in the setting of CA. will require iv hydration imaging including RUQ and CT of abd, abd labs, and discussion with oncology regarding admission. chemo drugs are: abemaciclib 150 mg labs are all abn today, concern for hemolysis awaiting heme onc called lab to include smear and add haptoglobin and ldh. spoke to dr. victor can admit to her service.

## 2018-08-12 NOTE — H&P ADULT - HISTORY OF PRESENT ILLNESS
Patient is a 69 yo F with PMHx of Metastatic Hormone receptor positive, HER 2 tuyet negative breast cancer with known mets to the spine, Lymph nodes, left breast mass, and stomach follows with Dr. Brink for cancer treatment. Per  and son and outpatient documentation, patient underwent an ultrasound with punch biopsy which showed evidence of disease progression. Patient followed was recently started on fourth line therapy known as Abemaciclib 150mg BID in combination with Fulvestrant. Per family, patient took her first dose on Friday and Saturday PTA. On Friday, they noted that patient had faint yellowing of skin with some anxiety. By Saturday she was noted to have altered mental status, mainly incoherent, repeating her words, nonsensical. They were concerned about her abrupt change in mentation so she was brought to the hospital for further evaluation. Per son, patient otherwise with no fever, occasional chills, no cough, wheezing, no diarrhea, no chest pain or SOB.     In the ED, patient with BP of 156/79, , RR 18, Temp 97.2F sat 94% on RA. She was found to have a hemoglobin of 6.2 (baseline as of May 2018 was 13.1). Platelet count of 83 (noted to be at 180 in May 2018). Peripheral smear shows evidence of schistocytes. Total Bilirubin was elevated at 10.7 with an indirect Bilirubin of 10.2. LDH elevated at 1057. Patient is a 67 yo F with PMHx of Metastatic Hormone receptor positive, HER 2 tuyet negative breast cancer with known mets to the spine, Lymph nodes, left breast mass, and stomach follows with Dr. Brink for cancer treatment presenting with acute change in mental status admitted for acute hemolytic anemia. Per  and son and outpatient documentation, patient underwent an ultrasound with punch biopsy which showed evidence of disease progression. Patient followed was recently started on fourth line therapy known as Abemaciclib 150mg BID in combination with Fulvestrant. Per family, patient took her first dose on Friday and Saturday PTA. On Friday, they noted that patient had faint yellowing of skin with some anxiety. By Saturday she was noted to have altered mental status, mainly incoherent, repeating her words, nonsensical. They were concerned about her abrupt change in mentation so she was brought to the hospital for further evaluation. Per son, patient otherwise with no fever, occasional chills, no cough, wheezing, no diarrhea, no chest pain or SOB.     In the ED, patient with BP of 156/79, , RR 18, Temp 97.2F sat 94% on RA. She was found to have a hemoglobin of 6.2 (baseline as of May 2018 was 13.1). Platelet count of 83 (noted to be at 180 in May 2018). Peripheral smear shows evidence of schistocytes. Total Bilirubin was elevated at 10.7 with an indirect Bilirubin of 10.2. LDH elevated at 1057.

## 2018-08-12 NOTE — ED PROVIDER NOTE - OBJECTIVE STATEMENT
she has worsening weakness, episodes of confusion and jaundice for past 2 days that is worsening, no fevers, no diarrhea, chronic nausea and decreased appetite. she has hx of metastatic lung cancer stage 4 on chemo that was recently changed. she is on chronic fentanyl patches alternating 100 mcg/25mcg. she is also on zofran.     PMD is [facility]  oncologist is Dr. Oliveros

## 2018-08-12 NOTE — H&P ADULT - FAMILY HISTORY
Grandparent  Still living? Unknown  Family history of lung cancer, Age at diagnosis: Age Unknown  Family history of pancreatic cancer, Age at diagnosis: Age Unknown

## 2018-08-13 LAB
ALBUMIN SERPL ELPH-MCNC: 3.5 G/DL — SIGNIFICANT CHANGE UP (ref 3.5–5.2)
ALP SERPL-CCNC: 56 U/L — SIGNIFICANT CHANGE UP (ref 30–115)
ALT FLD-CCNC: 19 U/L — SIGNIFICANT CHANGE UP (ref 0–41)
ANION GAP SERPL CALC-SCNC: 13 MMOL/L — SIGNIFICANT CHANGE UP (ref 7–14)
AST SERPL-CCNC: 40 U/L — SIGNIFICANT CHANGE UP (ref 0–41)
BASE EXCESS BLDA CALC-SCNC: -13.4 MMOL/L — LOW (ref -2–2)
BASE EXCESS BLDA CALC-SCNC: -15.6 MMOL/L — LOW (ref -2–2)
BASOPHILS # BLD AUTO: 0.02 K/UL — SIGNIFICANT CHANGE UP (ref 0–0.2)
BASOPHILS NFR BLD AUTO: 0.2 % — SIGNIFICANT CHANGE UP (ref 0–1)
BILIRUB DIRECT SERPL-MCNC: 0.4 MG/DL — HIGH (ref 0–0.2)
BILIRUB INDIRECT FLD-MCNC: 7.3 MG/DL — HIGH (ref 0.2–1.2)
BILIRUB SERPL-MCNC: 7.7 MG/DL — HIGH (ref 0.2–1.2)
BUN SERPL-MCNC: 28 MG/DL — HIGH (ref 10–20)
CALCIUM SERPL-MCNC: 8.1 MG/DL — LOW (ref 8.5–10.1)
CHLORIDE SERPL-SCNC: 106 MMOL/L — SIGNIFICANT CHANGE UP (ref 98–110)
CK MB CFR SERPL CALC: 1.8 NG/ML — SIGNIFICANT CHANGE UP (ref 0.6–6.3)
CK SERPL-CCNC: 194 U/L — SIGNIFICANT CHANGE UP (ref 0–225)
CO2 SERPL-SCNC: 28 MMOL/L — SIGNIFICANT CHANGE UP (ref 17–32)
CREAT SERPL-MCNC: 0.9 MG/DL — SIGNIFICANT CHANGE UP (ref 0.7–1.5)
EOSINOPHIL # BLD AUTO: 0.02 K/UL — SIGNIFICANT CHANGE UP (ref 0–0.7)
EOSINOPHIL NFR BLD AUTO: 0.2 % — SIGNIFICANT CHANGE UP (ref 0–8)
GLUCOSE SERPL-MCNC: 127 MG/DL — HIGH (ref 70–99)
HAPTOGLOB SERPL-MCNC: <20 MG/DL — LOW (ref 34–200)
HAPTOGLOB SERPL-MCNC: <20 MG/DL — LOW (ref 34–200)
HCO3 BLDA-SCNC: 12 MMOL/L — LOW (ref 23–27)
HCO3 BLDA-SCNC: 14 MMOL/L — LOW (ref 23–27)
HCT VFR BLD CALC: 16.4 % — LOW (ref 37–47)
HGB BLD-MCNC: 5.6 G/DL — CRITICAL LOW (ref 12–16)
IMM GRANULOCYTES NFR BLD AUTO: 1.6 % — HIGH (ref 0.1–0.3)
LDH SERPL L TO P-CCNC: 543 — HIGH (ref 50–242)
LYMPHOCYTES # BLD AUTO: 0.97 K/UL — LOW (ref 1.2–3.4)
LYMPHOCYTES # BLD AUTO: 10.5 % — LOW (ref 20.5–51.1)
MCHC RBC-ENTMCNC: 32.4 PG — HIGH (ref 27–31)
MCHC RBC-ENTMCNC: 34.1 G/DL — SIGNIFICANT CHANGE UP (ref 32–37)
MCV RBC AUTO: 94.8 FL — SIGNIFICANT CHANGE UP (ref 81–99)
MONOCYTES # BLD AUTO: 0.59 K/UL — SIGNIFICANT CHANGE UP (ref 0.1–0.6)
MONOCYTES NFR BLD AUTO: 6.4 % — SIGNIFICANT CHANGE UP (ref 1.7–9.3)
NEUTROPHILS # BLD AUTO: 7.53 K/UL — HIGH (ref 1.4–6.5)
NEUTROPHILS NFR BLD AUTO: 81.1 % — HIGH (ref 42.2–75.2)
NRBC # BLD: 10 /100 WBCS — HIGH (ref 0–0)
NT-PROBNP SERPL-SCNC: 2156 PG/ML — HIGH (ref 0–300)
PCO2 BLDA: 35 MMHG — LOW (ref 38–42)
PCO2 BLDA: 39 MMHG — SIGNIFICANT CHANGE UP (ref 38–42)
PH BLDA: 7.14 — CRITICAL LOW (ref 7.38–7.42)
PH BLDA: 7.17 — CRITICAL LOW (ref 7.38–7.42)
PHOSPHATE SERPL-MCNC: 3.1 MG/DL — SIGNIFICANT CHANGE UP (ref 2.1–4.9)
PLATELET # BLD AUTO: 69 K/UL — LOW (ref 130–400)
PO2 BLDA: 32 MMHG — CRITICAL LOW (ref 78–95)
PO2 BLDA: 54 MMHG — LOW (ref 78–95)
POTASSIUM SERPL-MCNC: 3.5 MMOL/L — SIGNIFICANT CHANGE UP (ref 3.5–5)
POTASSIUM SERPL-SCNC: 3.5 MMOL/L — SIGNIFICANT CHANGE UP (ref 3.5–5)
PROT SERPL-MCNC: 5.2 G/DL — LOW (ref 6–8)
RBC # BLD: 1.73 M/UL — LOW (ref 4.2–5.4)
RBC # BLD: 1.73 M/UL — LOW (ref 4.2–5.4)
RBC # FLD: 25.5 % — HIGH (ref 11.5–14.5)
RETICS #: 326.6 K/UL — HIGH (ref 25–125)
RETICS/RBC NFR: 19 % — HIGH (ref 0.5–1.5)
SAO2 % BLDA: 51 % — CRITICAL LOW (ref 94–98)
SAO2 % BLDA: 84 % — LOW (ref 94–98)
SODIUM SERPL-SCNC: 147 MMOL/L — HIGH (ref 135–146)
TROPONIN T SERPL-MCNC: 0.03 NG/ML — CRITICAL HIGH
WBC # BLD: 9.28 K/UL — SIGNIFICANT CHANGE UP (ref 4.8–10.8)
WBC # FLD AUTO: 9.28 K/UL — SIGNIFICANT CHANGE UP (ref 4.8–10.8)

## 2018-08-13 RX ORDER — FUROSEMIDE 40 MG
40 TABLET ORAL ONCE
Qty: 0 | Refills: 0 | Status: COMPLETED | OUTPATIENT
Start: 2018-08-13 | End: 2018-08-13

## 2018-08-13 RX ORDER — VANCOMYCIN HCL 1 G
1000 VIAL (EA) INTRAVENOUS EVERY 12 HOURS
Qty: 0 | Refills: 0 | Status: DISCONTINUED | OUTPATIENT
Start: 2018-08-13 | End: 2018-08-14

## 2018-08-13 RX ORDER — FENTANYL CITRATE 50 UG/ML
1 INJECTION INTRAVENOUS
Qty: 0 | Refills: 0 | Status: DISCONTINUED | OUTPATIENT
Start: 2018-08-13 | End: 2018-08-14

## 2018-08-13 RX ORDER — HALOPERIDOL DECANOATE 100 MG/ML
0.5 INJECTION INTRAMUSCULAR ONCE
Qty: 0 | Refills: 0 | Status: DISCONTINUED | OUTPATIENT
Start: 2018-08-13 | End: 2018-08-13

## 2018-08-13 RX ORDER — CEFEPIME 1 G/1
1000 INJECTION, POWDER, FOR SOLUTION INTRAMUSCULAR; INTRAVENOUS ONCE
Qty: 0 | Refills: 0 | Status: COMPLETED | OUTPATIENT
Start: 2018-08-13 | End: 2018-08-13

## 2018-08-13 RX ORDER — PANTOPRAZOLE SODIUM 20 MG/1
40 TABLET, DELAYED RELEASE ORAL DAILY
Qty: 0 | Refills: 0 | Status: DISCONTINUED | OUTPATIENT
Start: 2018-08-13 | End: 2018-08-14

## 2018-08-13 RX ORDER — FENTANYL CITRATE 50 UG/ML
0.5 INJECTION INTRAVENOUS
Qty: 2500 | Refills: 0 | Status: DISCONTINUED | OUTPATIENT
Start: 2018-08-13 | End: 2018-08-14

## 2018-08-13 RX ORDER — ACETAMINOPHEN 500 MG
650 TABLET ORAL ONCE
Qty: 0 | Refills: 0 | Status: COMPLETED | OUTPATIENT
Start: 2018-08-13 | End: 2018-08-13

## 2018-08-13 RX ORDER — FENTANYL CITRATE 50 UG/ML
25 INJECTION INTRAVENOUS EVERY 12 HOURS
Qty: 0 | Refills: 0 | Status: DISCONTINUED | OUTPATIENT
Start: 2018-08-13 | End: 2018-08-14

## 2018-08-13 RX ORDER — CALCIUM GLUCONATE 100 MG/ML
1 VIAL (ML) INTRAVENOUS ONCE
Qty: 0 | Refills: 0 | Status: DISCONTINUED | OUTPATIENT
Start: 2018-08-14 | End: 2018-08-14

## 2018-08-13 RX ORDER — CALCIUM GLUCONATE 100 MG/ML
1 VIAL (ML) INTRAVENOUS ONCE
Qty: 0 | Refills: 0 | Status: COMPLETED | OUTPATIENT
Start: 2018-08-13 | End: 2018-08-13

## 2018-08-13 RX ORDER — CEFEPIME 1 G/1
INJECTION, POWDER, FOR SOLUTION INTRAMUSCULAR; INTRAVENOUS
Qty: 0 | Refills: 0 | Status: DISCONTINUED | OUTPATIENT
Start: 2018-08-13 | End: 2018-08-14

## 2018-08-13 RX ORDER — PROPOFOL 10 MG/ML
10 INJECTION, EMULSION INTRAVENOUS
Qty: 1000 | Refills: 0 | Status: DISCONTINUED | OUTPATIENT
Start: 2018-08-13 | End: 2018-08-14

## 2018-08-13 RX ORDER — CEFEPIME 1 G/1
1000 INJECTION, POWDER, FOR SOLUTION INTRAMUSCULAR; INTRAVENOUS EVERY 8 HOURS
Qty: 0 | Refills: 0 | Status: DISCONTINUED | OUTPATIENT
Start: 2018-08-13 | End: 2018-08-14

## 2018-08-13 RX ADMIN — Medication 40 MILLIGRAM(S): at 09:48

## 2018-08-13 RX ADMIN — FENTANYL CITRATE 1 PATCH: 50 INJECTION INTRAVENOUS at 13:23

## 2018-08-13 RX ADMIN — FENTANYL CITRATE 25 MICROGRAM(S): 50 INJECTION INTRAVENOUS at 15:38

## 2018-08-13 RX ADMIN — Medication 650 MILLIGRAM(S): at 11:04

## 2018-08-13 RX ADMIN — Medication 200 GRAM(S): at 00:13

## 2018-08-13 RX ADMIN — Medication 200 GRAM(S): at 14:35

## 2018-08-13 RX ADMIN — CEFEPIME 100 MILLIGRAM(S): 1 INJECTION, POWDER, FOR SOLUTION INTRAMUSCULAR; INTRAVENOUS at 22:00

## 2018-08-13 RX ADMIN — Medication 1 MILLIGRAM(S): at 14:34

## 2018-08-13 RX ADMIN — Medication 50 MILLIGRAM(S): at 05:16

## 2018-08-13 RX ADMIN — Medication 1 MILLIGRAM(S): at 00:14

## 2018-08-13 RX ADMIN — Medication 40 MILLIGRAM(S): at 20:51

## 2018-08-13 RX ADMIN — Medication 1 MILLIGRAM(S): at 17:01

## 2018-08-13 RX ADMIN — Medication 250 MILLIGRAM(S): at 23:21

## 2018-08-13 RX ADMIN — Medication 125 MILLIGRAM(S): at 17:01

## 2018-08-13 RX ADMIN — Medication 1 MILLIGRAM(S): at 05:16

## 2018-08-13 RX ADMIN — FENTANYL CITRATE 25 MICROGRAM(S): 50 INJECTION INTRAVENOUS at 15:37

## 2018-08-13 NOTE — PROGRESS NOTE ADULT - SUBJECTIVE AND OBJECTIVE BOX
Patient is a 68y old  Female who presents with a chief complaint of Acute Hemolytic Anemia/ TTP (12 Aug 2018 18:53)      Subjective: Patient is more calm today , resting in bed comfortably - She denies any pain . She looks less jaundiced then yesterday.   She had plasma exchange yesterday with 3.5 L of FFP and tolerated well . She desated yesterday to 80's , placed on 4LNC      Vital Signs Last 24 Hrs  T(C): 37.6 (13 Aug 2018 00:00), Max: 37.9 (12 Aug 2018 20:00)  T(F): 99.7 (13 Aug 2018 00:00), Max: 100.3 (12 Aug 2018 20:00)  HR: 124 (13 Aug 2018 06:00) (114 - 130)  BP: 126/62 (13 Aug 2018 06:00) (104/68 - 156/79)  BP(mean): 78 (13 Aug 2018 06:00) (71 - 103)  RR: 36 (13 Aug 2018 06:00) (18 - 39)  SpO2: 95% (13 Aug 2018 06:00) (90% - 98%)    PHYSICAL EXAM  General: adult , weak, chachetic  HEENT: clear oropharynx, icteric sclera and conjunctiva  Neck: supple  CV: tachycardic  Lungs: decreased air entry in bases, poor inspiratory effort  Abdomen: soft non-tender non-distended, no hepatosplenomegaly  Ext: no clubbing cyanosis or edema  Skin: no rashes and no petechiae  Neuro: awake, alert , answer simple question and commands    MEDICATIONS  (STANDING):  fentaNYL   Patch  25 MICROgram(s)/Hr 1 Patch Transdermal every 72 hours  fentaNYL   Patch 100 MICROgram(s)/Hr 1 Patch Transdermal every 72 hours  furosemide   Injectable 40 milliGRAM(s) IV Push once  gabapentin 600 milliGRAM(s) Oral three times a day  oxybutynin 5 milliGRAM(s) Oral two times a day  pantoprazole    Tablet 40 milliGRAM(s) Oral two times a day    MEDICATIONS  (PRN):      LABS:                          5.6    9.28  )-----------( 69       ( 13 Aug 2018 04:30 )             16.4         Mean Cell Volume : 94.8 fL  Mean Cell Hemoglobin : 32.4 pg  Mean Cell Hemoglobin Concentration : 34.1 g/dL  Auto Neutrophil # : 7.53 K/uL  Auto Lymphocyte # : 0.97 K/uL  Auto Monocyte # : 0.59 K/uL  Auto Eosinophil # : 0.02 K/uL  Auto Basophil # : 0.02 K/uL  Auto Neutrophil % : 81.1 %  Auto Lymphocyte % : 10.5 %  Auto Monocyte % : 6.4 %  Auto Eosinophil % : 0.2 %  Auto Basophil % : 0.2 %      Serial CBC's  08-13 @ 04:30  Hct-16.4 / Hgb-5.6 / Plat-69 / RBC-1.73 / WBC-9.28  Serial CBC's  08-12 @ 20:19  Hct-18.2 / Hgb-6.2 / Plat-75 / RBC-1.88 / WBC-8.48  Serial CBC's  08-12 @ 14:22  Hct-18.3 / Hgb-6.2 / Plat-83 / RBC-1.93 / WBC-8.09      08-13    147<H>  |  106  |  28<H>  ----------------------------<  127<H>  3.5   |  28  |  0.9    Ca    8.1<L>      13 Aug 2018 04:30  Phos  3.1     08-13  Mg     2.2     08-12    TPro  5.2<L>  /  Alb  3.5  /  TBili  7.7<H>  /  DBili  0.4<H>  /  AST  40  /  ALT  19  /  AlkPhos  56  08-13      PT/INR - ( 12 Aug 2018 14:22 )   PT: 15.20 sec;   INR: 1.41 ratio         PTT - ( 12 Aug 2018 14:22 )  PTT:27.6 sec    Reticulocyte Percent: 19.0 % (08-13 @ 04:30)  Reticulocyte Percent: 16.1 % (08-12 @ 20:19)      RADIOLOGY & ADDITIONAL STUDIES:  < from: CT Abdomen and Pelvis w/ IV Cont (08.12.18 @ 17:06) >  IMPRESSION:        New bilateral small pleural effusions.    Unchanged sclerotic and lytic osseous metastases as above.    Otherwise, no evidence of acute intra-abdominal or pelvic pathology.    < end of copied text >    < from: CT Head No Cont (08.12.18 @ 17:05) >    IMPRESSION:    No evidence of acute intracranial pathology.        < end of copied text >    < from: US Abdomen Limited (08.12.18 @ 16:54) >  IMPRESSION:    No sonographic evidence for acute cholecystitis.    Right pleural effusion.          < end of copied text > Patient is a 68y old  Female who presents with a chief complaint of Acute Hemolytic Anemia/ TTP (12 Aug 2018 18:53)      Subjective: Patient is more calm today , resting in bed comfortably - She denies any pain . She looks less jaundiced then yesterday.   She had plasma exchange yesterday with 3.5 L of FFP and tolerated well . She desated yesterday to 80's , placed on 4LNC      Vital Signs Last 24 Hrs  T(C): 37.6 (13 Aug 2018 00:00), Max: 37.9 (12 Aug 2018 20:00)  T(F): 99.7 (13 Aug 2018 00:00), Max: 100.3 (12 Aug 2018 20:00)  HR: 124 (13 Aug 2018 06:00) (114 - 130)  BP: 126/62 (13 Aug 2018 06:00) (104/68 - 156/79)  BP(mean): 78 (13 Aug 2018 06:00) (71 - 103)  RR: 36 (13 Aug 2018 06:00) (18 - 39)  SpO2: 95% (13 Aug 2018 06:00) (90% - 98%)    PHYSICAL EXAM  General: adult , weak, chachetic  HEENT: clear oropharynx, icteric sclera and conjunctiva  Neck: supple  CV: tachycardic  Lungs: decreased air entry in bases, poor inspiratory effort  Abdomen: soft non-tender non-distended, no hepatosplenomegaly  Ext: no clubbing cyanosis or edema  Skin: no rashes and no petechiae  Neuro: awake, alert , answer simple question and commands    MEDICATIONS  (STANDING):  fentaNYL   Patch  25 MICROgram(s)/Hr 1 Patch Transdermal every 72 hours  fentaNYL   Patch 100 MICROgram(s)/Hr 1 Patch Transdermal every 72 hours  furosemide   Injectable 40 milliGRAM(s) IV Push once  gabapentin 600 milliGRAM(s) Oral three times a day  oxybutynin 5 milliGRAM(s) Oral two times a day  pantoprazole    Tablet 40 milliGRAM(s) Oral two times a day    MEDICATIONS  (PRN):      LABS:                          5.6    9.28  )-----------( 69       ( 13 Aug 2018 04:30 )             16.4         Mean Cell Volume : 94.8 fL  Mean Cell Hemoglobin : 32.4 pg  Mean Cell Hemoglobin Concentration : 34.1 g/dL  Auto Neutrophil # : 7.53 K/uL  Auto Lymphocyte # : 0.97 K/uL  Auto Monocyte # : 0.59 K/uL  Auto Eosinophil # : 0.02 K/uL  Auto Basophil # : 0.02 K/uL  Auto Neutrophil % : 81.1 %  Auto Lymphocyte % : 10.5 %  Auto Monocyte % : 6.4 %  Auto Eosinophil % : 0.2 %  Auto Basophil % : 0.2 %      Serial CBC's  08-13 @ 04:30  Hct-16.4 / Hgb-5.6 / Plat-69 / RBC-1.73 / WBC-9.28  Serial CBC's  08-12 @ 20:19  Hct-18.2 / Hgb-6.2 / Plat-75 / RBC-1.88 / WBC-8.48  Serial CBC's  08-12 @ 14:22  Hct-18.3 / Hgb-6.2 / Plat-83 / RBC-1.93 / WBC-8.09      08-13    147<H>  |  106  |  28<H>  ----------------------------<  127<H>  3.5   |  28  |  0.9    Ca    8.1<L>      13 Aug 2018 04:30  Phos  3.1     08-13  Mg     2.2     08-12    TPro  5.2<L>  /  Alb  3.5  /  TBili  7.7<H>  /  DBili  0.4<H>  /  AST  40  /  ALT  19  /  AlkPhos  56  08-13      PT/INR - ( 12 Aug 2018 14:22 )   PT: 15.20 sec;   INR: 1.41 ratio         PTT - ( 12 Aug 2018 14:22 )  PTT:27.6 sec    Reticulocyte Percent: 19.0 % (08-13 @ 04:30)  Reticulocyte Percent: 16.1 % (08-12 @ 20:19)      RADIOLOGY & ADDITIONAL STUDIES:  < from: CT Abdomen and Pelvis w/ IV Cont (08.12.18 @ 17:06) >  IMPRESSION:        New bilateral small pleural effusions.    Unchanged sclerotic and lytic osseous metastases as above.    Otherwise, no evidence of acute intra-abdominal or pelvic pathology.    < end of copied text >    < from: CT Head No Cont (08.12.18 @ 17:05) >    IMPRESSION:    No evidence of acute intracranial pathology.        < end of copied text >    < from: US Abdomen Limited (08.12.18 @ 16:54) >  IMPRESSION:    No sonographic evidence for acute cholecystitis.    Right pleural effusion.          < end of copied text >  < from: Xray Chest 1 View-PORTABLE IMMEDIATE (08.13.18 @ 06:39) >  mpression:      Worsening bilateral pulmonary opacities suggestive of ARDS    Diffuse osseous metastasis       < end of copied text >

## 2018-08-13 NOTE — PROGRESS NOTE ADULT - SUBJECTIVE AND OBJECTIVE BOX
DIAGNOSIS:   HOSPITAL DAY #:    STATUS POST:    POST OPERATIVE DAY #:     Vital Signs Last 24 Hrs  T(C): 38.6 (13 Aug 2018 12:00), Max: 38.9 (13 Aug 2018 08:00)  T(F): 101.5 (13 Aug 2018 12:00), Max: 102.1 (13 Aug 2018 08:00)  HR: 126 (13 Aug 2018 15:00) (114 - 146)  BP: 111/59 (13 Aug 2018 15:00) (103/57 - 164/71)  BP(mean): 79 (13 Aug 2018 15:00) (68 - 110)  RR: 31 (13 Aug 2018 15:00) (28 - 64)  SpO2: 86% (13 Aug 2018 15:00) (86% - 98%)    SUBJECTIVE: Pt seen    Pain: YES  [ ]   NO [ ]   Nausea: [ ] YES [ ] NO           Vomiting: [ ] YES [ ] NO  Flatus: [ ] YES [ ] NO             Bowel Movement: [ ] YES [ ] NO     Void: [ ]YES [ ]No      ESTEFANI DRAINAGE: SIGNIFICANT [ ]   NOT SIGNIFICANT [ ]   NOT APPLICABLE [ ]  YES [ ] NO    General Appearance: Appears well, NAD  Neck: Supple  Chest: Equal expansion bilaterally, equal breath sounds  CV: Pulse regular presently  Abdomen: Soft [x ] YES [ ]NO  DISTENDED [ ] YES [x ] NO TENDERNESS [ ]YES [ ]NO  INCISIONS: HEALING WELL [ ] YES  [ ] NO ERYTHEMA [ ] YES [ ] NO DRAINAGE [ ] YES  [ ] NO  Extremities: Grossly symmetric, CALF TENDERNESS [ ] YES  [ ] NO  no groin hematoma    LABS:                        5.6    9.28  )-----------( 69       ( 13 Aug 2018 04:30 )             16.4     08-13    147<H>  |  106  |  28<H>  ----------------------------<  127<H>  3.5   |  28  |  0.9    Ca    8.1<L>      13 Aug 2018 04:30  Phos  3.1     08-13  Mg     2.2     08-12    TPro  5.2<L>  /  Alb  3.5  /  TBili  7.7<H>  /  DBili  0.4<H>  /  AST  40  /  ALT  19  /  AlkPhos  56  08-13    PT/INR - ( 12 Aug 2018 14:22 )   PT: 15.20 sec;   INR: 1.41 ratio         PTT - ( 12 Aug 2018 14:22 )  PTT:27.6 sec        ASSESSMENT:     GOOD POST OP COURSE [ ]  YES  [ ] NO  CONDITION IMPROVING  []  YES  [ ]  NO          PLAN:    CONTINUE PRESENT MANAGEMENT  [x ] YES  [ ] NO

## 2018-08-13 NOTE — PROGRESS NOTE ADULT - SUBJECTIVE AND OBJECTIVE BOX
69 y/o female with diagnosis of TTP/AIHA scheduled for second plasmapheresis treatment (first performed 8/12/18) using 3.5 L of FFP as a replacement fluid.    Goal: platelets > 150 K for two consecutive days and normal LDH level.    Most recent labs: platelet 75 K, .    Please do not hesitate to contact us with questions or concerns.    Subha Rios MD   of Transfusion Medicine  [phone]

## 2018-08-13 NOTE — PROGRESS NOTE ADULT - SUBJECTIVE AND OBJECTIVE BOX
Patient is a 68y old Female who presents with a chief complaint of Acute Hemolytic Anemia/ TTP (12 Aug 2018 18:53)    Currently admitted to medicine with the primary diagnosis of Acute hemolytic anemia    Today is hospital day 1d.    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 Brink for cancer treatment .  She presents with AMS for 36 hours, founds to have acute hemolytic anemia and likely TTP. Per  and son and outpatient documentation, patient underwent an breast ultrasound with biopsy showing return of the CA, so started on 4th therapy Abemaciclib 150mg BID and Fulvestrant. 1st dose on Friday - 3 doses total. On Friday, they noted that patient had faint yellowing of skin with some anxiety. By Saturday 8/11 she had AMS from coherent baseline, became incoherent, repeating nonsensical words/sounds. Otherwise no complaints of fever, occasional chills, with no cough, wheezing, no diarrhea, no chest pain or SOB as per family.    In the ED, patient with BP of 156/79, , RR 18, Temp 97.2F sat 94% on RA. She was found to have a hemoglobin of 6.2 (baseline May 2018, 13.1). Platelet count of 83 (180 in May 2018). Peripheral smear shows evidence of schistocytes. Total Bilirubin was elevated at 10.7 with an indirect Bilirubin of 10.2. LDH elevated at 1057, haptoglobin pending. CXR showed pulmonary edema b/l vs ARDS, pt placed on venti-mask, started on Lasix, consulting with Dr. Nathan (cardio). BNP 2100.     8/12: 1st plasmapheresis 3.5L FFP, tolerated well  8/13: 2nd plasmapheresis 3.5L FFP; Hbg 5.6, tachycardic to 130s, SaO2 100% on ventimask - to receive 2x PRBC  - made 200cc urine on Lasix 40mg  - Cardio US bedside did not suggest R heart strain; no obvious signs of fluid overload or dehydration       EVENTS LAST 24HRS:  Pt seen and examined at bedside.  Patient is incoherent and unable to answer questions. Mildly agitated, but does not seem to be related to specific area/cause.   Likely confused 2/2 encephalopathy.     Patient noted to be tachycardic to 130s, possibly 2/2 agitation with the underlying anemia. Will monitor closely, BP MAP > 60 since arrival. Pt on ventimask.  Plasmapheresis at bedside today.  To get x2 PRBC units.      PAST MEDICAL & SURGICAL HISTORY  No pertinent past medical history  History of tonsillectomy  History of fracture of femur    SOCIAL HISTORY:  Denies x3.     REVIEW OF SYSTEMS:  See HPI    ALLERGIES:  erythromycin (Unknown)  sulfa drugs (Unknown)  tetracycline (Unknown)    MEDICATIONS:  STANDING MEDICATIONS  calcium gluconate IVPB 1 Gram(s) IV Intermittent once  diltiazem    Tablet 30 milliGRAM(s) Oral every 6 hours  diltiazem Injectable 5 milliGRAM(s) IV Push once  fentaNYL   Patch  50 MICROgram(s)/Hr. 1 Patch Transdermal every 48 hours  gabapentin 600 milliGRAM(s) Oral three times a day  methylPREDNISolone sodium succinate Injectable 125 milliGRAM(s) IV Push two times a day  oxybutynin 5 milliGRAM(s) Oral two times a day  pantoprazole    Tablet 40 milliGRAM(s) Oral two times a day    PRN MEDICATIONS    VITALS:         ICU Vital Signs Last 24 Hrs:    T(C): 38.6 (13 Aug 2018 12:00), Max: 38.9 (13 Aug 2018 08:00)  T(F): 101.5 (13 Aug 2018 12:00), Max: 102.1 (13 Aug 2018 08:00)  HR: 122 (13 Aug 2018 13:00) (114 - 146)  BP: 103/57 (13 Aug 2018 13:00) (103/57 - 164/71)  BP(mean): 68 (13 Aug 2018 13:00) (68 - 110)  ABP: --  ABP(mean): --  RR: 28 (13 Aug 2018 13:00) (18 - 64)  SpO2: 96% (13 Aug 2018 13:00) (87% - 98%)          I&O's Detail:      12 Aug 2018 07:01  -  13 Aug 2018 07:00  --------------------------------------------------------  IN:    Sodium Chloride 0.9% IV Bolus: 1000 mL  Total IN: 1000 mL    OUT:    Indwelling Catheter - Urethral: 530 mL  Total OUT: 530 mL    Total NET: 470 mL      13 Aug 2018 07:01  -  13 Aug 2018 13:59  --------------------------------------------------------  IN:  Total IN: 0 mL    OUT:    Indwelling Catheter - Urethral: 370 mL  Total OUT: 370 mL    Total NET: -370 mL          LABS:                        5.6    9.28  )-----------( 69       ( 13 Aug 2018 04:30 )             16.4     08-13    147<H>  |  106  |  28<H>  ----------------------------<  127<H>  3.5   |  28  |  0.9    Ca    8.1<L>      13 Aug 2018 04:30  Phos  3.1     08-13  Mg     2.2     08-12    TPro  5.2<L>  /  Alb  3.5  /  TBili  7.7<H>  /  DBili  0.4<H>  /  AST  40  /  ALT  19  /  AlkPhos  56  08-13    CARDIAC MARKERS ( 13 Aug 2018 04:30 )  x     / 0.03 ng/mL / 194 U/L / x     / 1.8 ng/mL  CARDIAC MARKERS ( 12 Aug 2018 20:41 )  x     / 0.02 ng/mL / x     / x     / 1.7 ng/mL  CARDIAC MARKERS ( 12 Aug 2018 20:19 )  x     / x     / 120 U/L / x     / x          CAPILLARY BLOOD GLUCOSE  PT/INR - ( 12 Aug 2018 14:22 )   PT: 15.20 sec;   INR: 1.41 ratio    PTT - ( 12 Aug 2018 14:22 )  PTT:27.6 sec    Serum Pro-Brain Natriuretic Peptide (08.13.18 @ 04:30)    Serum Pro-Brain Natriuretic Peptide: 2156 pg/mL      PHYSICAL EXAM:  General: mild distress. no respiratory distress; Alert, not oriented to place/person, unable to follow basic commands, non-focal; pt seems anxious and watchful  HEENT: Pupils equal, reactive to light symmetrically; scleral and mucosal icterus +  PULM: Clear to auscultation bilaterally, mild bibasilar crackles, no wheezing  CVS: tachycardic with a regular rate and rhythm, no murmurs  ABD: Soft, nondistended, nontender, no organomegally, no RUQ tenderness  EXT: No peripheral edema  SKIN: Warm and well perfused  NEURO: AAOx1, not speaking words that make sense , poor withdrawal to pain but moving all extremities,    RADIOLOGY:   < from: CT Abdomen and Pelvis w/ IV Cont (08.12.18 @ 17:06) >  New bilateral small pleural effusions.    Unchanged sclerotic and lytic osseous metastases as above.    Otherwise, no evidence of acute intra-abdominal or pelvic pathology.  I have reviewed the above preliminary report with the following   comment-there are skin thickening involving the left breast presumably   representing a malignant process.    < end of copied text >  < from: CT Head No Cont (08.12.18 @ 17:05) >  No evidence of acute intracranial pathology.    < end of copied text >  < from: Xray Chest 1 View-PORTABLE IMMEDIATE (08.13.18 @ 06:39) >    Worsening bilateral pulmonary opacities suggestive of ARDS    Diffuse osseous metastasis     < end of copied text >

## 2018-08-13 NOTE — PROGRESS NOTE ADULT - ATTENDING COMMENTS
Pt seen and examined. Agree with above A/P.   at bedside.  States she has been having AMS since 3 days. Denies any new OTC meds although took ??thionine a few days ago.  Labs and radiology reviewed.  CXR shows findings of ARDS.  Get Echo, cardiology eval and pulmonary follow up.  D/w pt's brother ( HCP) that given the clinical picture and CXR findings she may need vent support if resp status worsens.  Continue with daily plasmapheresis, steroids.  Transfuse PRBCs.  D/W pain management also (). He recommended not to add any more opioids given AMS and even consider decreasing Fentanyl to 100mcg/hr.  Overall prognosis is extremely guarded. Family made aware.

## 2018-08-13 NOTE — PROCEDURE NOTE - NSINFORMCONSENT_GEN_A_CORE
This was an emergent procedure.
emergent udall for plasmapheresis/Benefits, risks, and possible complications of procedure explained to patient/caregiver who verbalized understanding and gave verbal consent.
Benefits, risks, and possible complications of procedure explained to patient/caregiver who verbalized understanding and gave written consent.

## 2018-08-13 NOTE — CHART NOTE - NSCHARTNOTEFT_GEN_A_CORE
Medify is in place and groing looks fine. Medify is ready to use. Call [phone] in case of any queries. Thanks.

## 2018-08-13 NOTE — CHART NOTE - NSCHARTNOTEFT_GEN_A_CORE
Post Operative Note  Patient: JONE YBARRA 68y (1950) Female   MRN: [id number]  Location: [facility] A  Visit: 08-12-18 Inpatient  Date: 08-13-18 @ 08:26    Procedure: S/P UDALL placement    Subjective:   Nausea: no, Vomiting: no, Ambulating: no, Flatus: no  Pain Assessment: no    Objective:  Vitals: T(F): 99.7 (08-13-18 @ 00:00), Max: 100.3 (08-12-18 @ 20:00)  HR: 124 (08-13-18 @ 06:00)  BP: 126/62 (08-13-18 @ 06:00) (104/68 - 156/79)  RR: 36 (08-13-18 @ 06:00)  SpO2: 95% (08-13-18 @ 06:00)  Vent Settings:     In:   08-12-18 @ 07:01  -  08-13-18 @ 07:00  --------------------------------------------------------  IN: 1000 mL      IV Fluids:     Out:   08-12-18 @ 07:01  -  08-13-18 @ 07:00  --------------------------------------------------------  OUT: 510 mL      EBL:     Voided Urine:   08-12-18 @ 07:01  -  08-13-18 @ 07:00  --------------------------------------------------------  OUT: 510 mL      Lima Catheter: yes no   Drains:   ESTEFANI:    ,   Chest Tube:      NG Tube:       Physical Examination:  General Appearance: drowsy, altered mental status  HEENT: NCAT,   Heart: S1 and S2.  Lungs: Clear to auscultation  Abdomen:  Positive bowel sounds. Soft, nondistended, nontender.  MSK/Extremities: No joint erythema or tenderness. No significant deformity or joint abnormality.  Skin: Warm/dry, Normal color, texture and turgor with no lesions or eruptions. No jaundice.   Groin: UDALL looks fine with dressings in place, clean, dry, intact, no signs of infection    Medications: [Standing]  fentaNYL   Patch  25 MICROgram(s)/Hr 1 Patch Transdermal every 72 hours  fentaNYL   Patch 100 MICROgram(s)/Hr 1 Patch Transdermal every 72 hours  furosemide   Injectable 40 milliGRAM(s) IV Push once  gabapentin 600 milliGRAM(s) Oral three times a day  oxybutynin 5 milliGRAM(s) Oral two times a day  pantoprazole    Tablet 40 milliGRAM(s) Oral two times a day    Medications: [PRN]  fentaNYL   Patch  25 MICROgram(s)/Hr 1 Patch Transdermal every 72 hours  fentaNYL   Patch 100 MICROgram(s)/Hr 1 Patch Transdermal every 72 hours  furosemide   Injectable 40 milliGRAM(s) IV Push once  gabapentin 600 milliGRAM(s) Oral three times a day  oxybutynin 5 milliGRAM(s) Oral two times a day  pantoprazole    Tablet 40 milliGRAM(s) Oral two times a day    Labs:                        5.6    9.28  )-----------( 69       ( 13 Aug 2018 04:30 )             16.4     08-13    147<H>  |  106  |  28<H>  ----------------------------<  127<H>  3.5   |  28  |  0.9    Ca    8.1<L>      13 Aug 2018 04:30  Phos  3.1     08-13  Mg     2.2     08-12    TPro  5.2<L>  /  Alb  3.5  /  TBili  7.7<H>  /  DBili  0.4<H>  /  AST  40  /  ALT  19  /  AlkPhos  56  08-13    PT/INR - ( 12 Aug 2018 14:22 )   PT: 15.20 sec;   INR: 1.41 ratio         PTT - ( 12 Aug 2018 14:22 )  PTT:27.6 sec  CARDIAC MARKERS ( 13 Aug 2018 04:30 )  x     / 0.03 ng/mL / 194 U/L / x     / 1.8 ng/mL  CARDIAC MARKERS ( 12 Aug 2018 20:41 )  x     / 0.02 ng/mL / x     / x     / 1.7 ng/mL  CARDIAC MARKERS ( 12 Aug 2018 20:19 )  x     / x     / 120 U/L / x     / x          Imaging:  No post-op imaging studies    Assessment:  68yFemale patient S/P UDALL placement for HD.    Plan:  -UDALL is in place and ready to use.  -sign off. reconsult as needed.  -continue routine management.    Date/Time: 08-13-18 @ 08:26

## 2018-08-13 NOTE — PROGRESS NOTE ADULT - ASSESSMENT
68 yr old female patient with metastatic breast Ca HR positive and Her2 negative with spine, gastric, soft tissue and stomach metastasis, and recent recurrence in the breast  is here for worsening confusion , and jaundice after starting Abemaciclib ( CDK inhibitor)    Peripheral smear reviewed and showed shistocytes  Blood work showing low H&H, elevated indirect bilirubin , LDH 1052 , low platelets   Suggestive of TTP    # Thrombotic thrombocytopenic purpura : confusion , thombocytopenia and hemolytic anemia  - Absolute  retic 300 -->326    Haptoglobin and krystal , ADAMTS 13 pending    LDH 1057 -->543  - s/p Plasmapheresis with 3.5 L of FFP on 8/12/2018     Hemoglobin today is 5.6 with plt 69 , Indirect bilirubin 7.3 with a total bilirubin of 7.7  Plasmapheresis daily until recovery occurs : plt >=079133, for 2 consecutive days , LDH normalizing   Will consider glucocorticoids ( prednisone 1mg/kg daily or solumedrol 125 mg BID to Q6hrs)  Daily cbcd, retic count, LDH, calcium, phosphorus    Will discuss with attending and follow with recommendations 68 yr old female patient with metastatic breast Ca HR positive and Her2 negative with spine, gastric, soft tissue and stomach metastasis, and recent recurrence in the breast  is here for worsening confusion , and jaundice after starting Abemaciclib ( CDK inhibitor)    Peripheral smear reviewed and showed shistocytes  Blood work showing low H&H, elevated indirect bilirubin , LDH 1052 , low platelets   Suggestive of TTP    # Thrombotic thrombocytopenic purpura : confusion , thombocytopenia and hemolytic anemia  - Absolute  retic 300 -->326    Haptoglobin and krystal , ADAMTS 13 pending    LDH 1057 -->543  - s/p Plasmapheresis with 3.5 L of FFP on 8/12/2018     Hemoglobin today is 5.6 with plt 69 , Indirect bilirubin 7.3 with a total bilirubin of 7.7  Plasmapheresis daily until recovery occurs : plt >=903810, for 2 consecutive days , LDH normalizing   Will consider glucocorticoids ( prednisone 1mg/kg daily or solumedrol 125 mg BID to Q6hrs) after discussing with attending  Daily cbcd, retic count, LDH, calcium, phosphorus    Plan for today : Plasmapheresis today                         Transfuse 2 PRBCs                         Will follow with further recs 68 yr old female patient with metastatic breast Ca HR positive and Her2 negative with spine, gastric, soft tissue and stomach metastasis, and recent recurrence in the breast  is here for worsening confusion , and jaundice after starting Abemaciclib ( CDK inhibitor)    Peripheral smear reviewed and showed shistocytes  Blood work showing low H&H, elevated indirect bilirubin , LDH 1052 , low platelets   Suggestive of TTP    # Thrombotic thrombocytopenic purpura : confusion , thombocytopenia and hemolytic anemia  - Absolute  retic 300 -->326    Haptoglobin and krystal , ADAMTS 13 pending    LDH 1057 -->543  - s/p Plasmapheresis with 3.5 L of FFP on 8/12/2018     Hemoglobin today is 5.6 with plt 69 , Indirect bilirubin 7.3 with a total bilirubin of 7.7  Plasmapheresis daily until recovery occurs : plt >=[id number], for 2 consecutive days , LDH normalizing   Start glucocorticoids ( solumedrol 125 mg BID   Daily cbcd, retic count, LDH, calcium, phosphorus  TTE  Consult Pain mngmt - Patient is on medical marijuana for chronic pain    Plan for today : Plasmapheresis today                         Transfuse 2 PRBCs                         Will follow with further recs

## 2018-08-13 NOTE — CONSULT NOTE ADULT - SUBJECTIVE AND OBJECTIVE BOX
Patient is a 68y old  Female who presents with a chief complaint of Acute Hemolytic Anemia/ TTP (12 Aug 2018 18:53)      HPI:  Patient is a 69 yo F with PMHx of Metastatic Hormone receptor positive, HER 2 tuyet negative breast cancer with known mets to the spine, Lymph nodes, left breast mass, and stomach follows with Dr. Brink for cancer treatment presenting with acute change in mental status admitted for acute hemolytic anemia. Per  and son and outpatient documentation, patient underwent an ultrasound with punch biopsy which showed evidence of disease progression. Patient followed was recently started on fourth line therapy known as Abemaciclib 150mg BID in combination with Fulvestrant. Per family, patient took her first dose on Friday and Saturday PTA. On Friday, they noted that patient had faint yellowing of skin with some anxiety. By Saturday she was noted to have altered mental status, mainly incoherent, repeating her words, nonsensical. They were concerned about her abrupt change in mentation so she was brought to the hospital for further evaluation. Per son, patient otherwise with no fever, occasional chills, no cough, wheezing, no diarrhea, no chest pain or SOB.     In the ED, patient with BP of 156/79, , RR 18, Temp 97.2F sat 94% on RA. She was found to have a hemoglobin of 6.2 (baseline as of May 2018 was 13.1). Platelet count of 83 (noted to be at 180 in May 2018). Peripheral smear shows evidence of schistocytes. Total Bilirubin was elevated at 10.7 with an indirect Bilirubin of 10.2. LDH elevated at 1057. (12 Aug 2018 18:53)      PAST MEDICAL & SURGICAL HISTORY:  mETASTATIC BREAST cA  History of tonsillectomy  History of fracture of femur      SOCIAL HX:   Smoking            NO             ETOH   NO                         Other    FAMILY HISTORY:  Family history of pancreatic cancer (Grandparent)  Family history of lung cancer (Grandparent)  :  No known cardiovacular family hisotry     ROS:  See HPI     Allergies    erythromycin (Unknown)  sulfa drugs (Unknown)  tetracycline (Unknown)    Intolerances          PHYSICAL EXAM    ICU Vital Signs Last 24 Hrs  T(C): 38.9 (13 Aug 2018 08:00), Max: 38.9 (13 Aug 2018 08:00)  T(F): 102.1 (13 Aug 2018 08:00), Max: 102.1 (13 Aug 2018 08:00)  HR: 130 (13 Aug 2018 08:30) (114 - 130)  BP: 134/68 (13 Aug 2018 08:30) (104/68 - 156/79)  BP(mean): 89 (13 Aug 2018 08:30) (71 - 103)  ABP: --  ABP(mean): --  RR: 38 (13 Aug 2018 08:30) (18 - 41)  SpO2: 94% (13 Aug 2018 08:30) (90% - 98%)      General: In mild distress   HEENT:  AMARIS              Lymphatic system: No cervical LN   Lungs: Bilateral BS.  Bilateral crackles   Cardiovascular: Regular  Gastrointestinal: Soft, Positive BS  Musculoskeletal: No clubbing.  Moves all extremities.  Full range of motion   Skin: Warm.  Intact  Neurological: No motor or sensory deficit.  Does not follow commands       08-12-18 @ 07:01  -  08-13-18 @ 07:00  --------------------------------------------------------  IN:    Sodium Chloride 0.9% IV Bolus: 1000 mL  Total IN: 1000 mL    OUT:    Indwelling Catheter - Urethral: 530 mL  Total OUT: 530 mL    Total NET: 470 mL      08-13-18 @ 07:01  -  08-13-18 @ 09:25  --------------------------------------------------------  IN:  Total IN: 0 mL    OUT:    Indwelling Catheter - Urethral: 20 mL  Total OUT: 20 mL    Total NET: -20 mL          LABS:                          5.6    9.28  )-----------( 69       ( 13 Aug 2018 04:30 )             16.4                                               08-13    147<H>  |  106  |  28<H>  ----------------------------<  127<H>  3.5   |  28  |  0.9    Ca    8.1<L>      13 Aug 2018 04:30  Phos  3.1     08-13  Mg     2.2     08-12    TPro  5.2<L>  /  Alb  3.5  /  TBili  7.7<H>  /  DBili  0.4<H>  /  AST  40  /  ALT  19  /  AlkPhos  56  08-13      PT/INR - ( 12 Aug 2018 14:22 )   PT: 15.20 sec;   INR: 1.41 ratio         PTT - ( 12 Aug 2018 14:22 )  PTT:27.6 sec                                           CARDIAC MARKERS ( 13 Aug 2018 04:30 )  x     / 0.03 ng/mL / 194 U/L / x     / 1.8 ng/mL  CARDIAC MARKERS ( 12 Aug 2018 20:41 )  x     / 0.02 ng/mL / x     / x     / 1.7 ng/mL  CARDIAC MARKERS ( 12 Aug 2018 20:19 )  x     / x     / 120 U/L / x     / x                                                LIVER FUNCTIONS - ( 13 Aug 2018 04:30 )  Alb: 3.5 g/dL / Pro: 5.2 g/dL / ALK PHOS: 56 U/L / ALT: 19 U/L / AST: 40 U/L / GGT: x                                                                                                                                       X-Rays             Pulmonary edema                                                                        ECHO    MEDICATIONS  (STANDING):  fentaNYL   Patch  25 MICROgram(s)/Hr 1 Patch Transdermal every 72 hours  fentaNYL   Patch 100 MICROgram(s)/Hr 1 Patch Transdermal every 72 hours  furosemide   Injectable 40 milliGRAM(s) IV Push once  gabapentin 600 milliGRAM(s) Oral three times a day  oxybutynin 5 milliGRAM(s) Oral two times a day  pantoprazole    Tablet 40 milliGRAM(s) Oral two times a day    MEDICATIONS  (PRN):

## 2018-08-13 NOTE — PROCEDURE NOTE - ADDITIONAL PROCEDURE DETAILS
pre /72, , RR 20 o2 sat 92. succinycholine 100mg IV etomidate 16mg IV. grade 2 view DL mac 3 used atraumatic placement noted. B/P 110/62, , on vent o2 sat 94.
[place] placed under ultrasound guidance ,sterile procedure time out was initiated. Dr Melendez present on catether insertion. Patient tolerated well. Line is ready for use.

## 2018-08-13 NOTE — CONSULT NOTE ADULT - ASSESSMENT
Assessment and Plan:  Patient is a 68y Female PMH Breast ca with mets on new line of treatment , jaundice and alerted mental status. Hemolytic anemia and TTP needs plasmapherisis    [place] placed at bedside under ultrasound guidance 20 cm catether placed sutured in place  Can use catheter for plasmapheresis  Call surgery team as needed #[phone]/ #[phone]     CIRO Lozano  08-12-18 @ 20:06  #[phone]/ [phone]
IMPRESSION:    AHRF pulmonary edema  TTP  HO metastatic breast Ca      PLAN:    CNS: Continue Pain control.  Avoid oversedation     HEENT: Oral care.  Aspiration precaution     PULMONARY:  HOB @ 45 degrees.  Wean O2    CARDIOVASCULAR: Rate control.  Cardizem for rate control.   Lasix 40 mg BID.  ECHO.  Cards evaluation     GI: GI prophylaxis.  NPO for now     RENAL:  Follow up lytes.  Correct as needed    INFECTIOUS DISEASE: Follow up cultures.  Cefepime and VAnc for now.    HEMATOLOGICAL:  DVT prophylaxis. LE duplex.  FU with Hem Onc.  Plasmapheresis today.  2 Units of PRBCs after diuresis     ENDOCRINE:  Follow up FS.  Insulin protocol if needed.    MUSCULOSKELETAL:    Prognosis poor.    GOC DW son
68 yr old female patient with metastatic breast Ca HR positive and Her2 negative with spine, gastric, soft tissue and stomach metastasis, and recent recurrence in the breast  is here for worsening confusion , and jaundice after starting Abemaciclib ( CDK inhibitor)    Peripheral smear reviewed and showed shistocytes  Blood work showing low H&H, elevated indirect bilirubin , LDH 1052 , low platelets   Suggestive of TTP    # Thrombotic thrombocytopenic purpura : confusion , thombocytopenia and hemolytic anemia  - Will send for retic count, haptoglobin and krystal , ADAMTS 13  - Plasmapheresis arranged with 3.5 L of FFP    Udol placed by surgery  - Family at bedside updated , informed about risk/ benefits - possible complications    and agreed on treatment    Plasmapheresis tonight  Daily cbc, bmp and LFTs, pt/ptt, retic count, LDH , haptoglobin   Avoid transfusion unless symptomatic  Case discussed with Dr Hall and Dr Brink

## 2018-08-13 NOTE — PROGRESS NOTE ADULT - ASSESSMENT
#) Acute Hemolytic Anemia; AMS likely TTP  - Heme Onc (Dr. Brink): give x2 PRBC, c/w plasmapheresis, start solumedrol 125mg BID, 2d echo, Dr. Fraga pain mgnt consult  - UDall in place, by surgery   - Plasmapheresis 8/13: with 3.5 L of FFP, 1g calcium gluconate, no benadryl (GIVEN: 8/12, 8/13)  - f/u [place] count, haptoglobin, krystal, and ADAMTS 13  - Daily cbc, bmp and LFTs, pt/ptt, retic count, LDH , haptoglobin     #) Tachycardia, Fluid Overload on CXR, QTc >500  - f/u cardiology (Dr. Nathan)  - Troponin 0.02 > 0.02 > 0.03, will trend 1 more set    #) Chronic Pain  - patient on 125mcg of Fentanyl patches q48h at home  - has had withdrawal symptoms when tried to wean down, described as 'tremoring, diaphoresis' by family  - PATCH CHANGED TO 50mcq Q48hr.  - monitor closely for WD sxs  - f/u pain consult, Dr. Fraga     #) Metastatic Breast Cancer (f/u Dr. Brink)  - Hold off and restart chemotherapy as indicated by heme onc    #) Anxiety  - Patient takes Xanax 0.5mg as needed at home, hold off for now given acute agitation   - DO NOT GIVE HALDOL, QTc >500  - Ativan 1mg PRN as needed.    MISC:  - DVT: SCDs for now   - GI PPx: Protonix 40mg BID (home dose)   - From Home  - full code  - prognosis: guarded #) Acute Hemolytic Anemia; AMS likely TTP  - Heme Onc (Dr. Brink): give x2 PRBC, c/w plasmapheresis, start solumedrol 125mg BID, 2d echo, Dr. Fraga pain mgnt consult  - UDall in place, by surgery   - Plasmapheresis 8/13: with 3.5 L of FFP, 1g calcium gluconate, no benadryl (GIVEN: 8/12, 8/13)  - f/u Retic count, haptoglobin, krystal, and ADAMTS 13  - Daily cbc, bmp and LFTs, pt/ptt, retic count, LDH , haptoglobin   - f/u UA, blood cultures  - c/w cefepime, vancomycin    #) Tachycardia, Fluid Overload on CXR, QTc >500  - f/u cardiology (Dr. Nathan)  - Troponin 0.02 > 0.02 > 0.03, will trend 1 more set  - f/u 2D echo    #) Chronic Pain  - patient on 125mcg of Fentanyl patches q48h at home  - has had withdrawal symptoms when tried to wean down, described as 'tremoring, diaphoresis' by family  - PATCH CHANGED TO 50mcq Q48hr.  - monitor closely for WD sxs  - f/u pain consult, Dr. Fraga     #) Metastatic Breast Cancer (f/u Dr. Brink)  - Hold off and restart chemotherapy as indicated by heme onc    #) Anxiety  - Patient takes Xanax 0.5mg as needed at home, hold off for now given acute agitation   - DO NOT GIVE HALDOL, QTc >500  - Ativan 1mg PRN as needed.    MISC:  - DVT: SCDs for now   - GI PPx: Protonix 40mg BID (home dose)   - From Home  - full code  - prognosis: guarded

## 2018-08-13 NOTE — PROCEDURE NOTE - NSPROCDETAILS_GEN_ALL_CORE
patient pre-oxygenated, tube inserted, placement confirmed
guidewire recovered/sterile technique, catheter placed/ultrasound guidance/sterile dressing applied/lumen(s) aspirated and flushed
guidewire recovered/lumen(s) aspirated and flushed/sterile technique, catheter placed/sterile dressing applied/ultrasound guidance

## 2018-08-13 NOTE — CONSULT NOTE ADULT - PROBLEM SELECTOR RECOMMENDATION 9
pt with TTP.  pt with sinus tachycardia most likely physiologic response to severe anemia, agitation and possible sepsis.  pt with a hyperdynamic lv, rve and pulm htn.   pt appears to have non cardiogenic pulm edema  treat underlying cause of tachycardia as doing with correcting anemia.   hold off on further diuresis for now  pt with poor prognosis.

## 2018-08-13 NOTE — CONSULT NOTE ADULT - SUBJECTIVE AND OBJECTIVE BOX
Patient is a 68y old  Female who presents with a chief complaint of Acute Hemolytic Anemia/ TTP (12 Aug 2018 18:53)      HPI:  Patient is a 67 yo F with PMHx of Metastatic Hormone receptor positive, HER 2 tuyet negative breast cancer with known mets to the spine, Lymph nodes, left breast mass, and stomach follows with Dr. Brink for cancer treatment presenting with acute change in mental status admitted for acute hemolytic anemia. Per  and son and outpatient documentation, patient underwent an ultrasound with punch biopsy which showed evidence of disease progression. Patient followed was recently started on fourth line therapy known as Abemaciclib 150mg BID in combination with Fulvestrant. Per family, patient took her first dose on Friday and Saturday PTA. On Friday, they noted that patient had faint yellowing of skin with some anxiety. By Saturday she was noted to have altered mental status, mainly incoherent, repeating her words, nonsensical. They were concerned about her abrupt change in mentation so she was brought to the hospital for further evaluation. Per son, patient otherwise with no fever, occasional chills, no cough, wheezing, no diarrhea, no chest pain or SOB.     In the ED, patient with BP of 156/79, , RR 18, Temp 97.2F sat 94% on RA. She was found to have a hemoglobin of 6.2 (baseline as of May 2018 was 13.1). Platelet count of 83 (noted to be at 180 in May 2018). Peripheral smear shows evidence of schistocytes. Total Bilirubin was elevated at 10.7 with an indirect Bilirubin of 10.2. LDH elevated at 1057. (12 Aug 2018 18:53)      PAST MEDICAL & SURGICAL HISTORY:  No pertinent past medical history  History of tonsillectomy  History of fracture of femur      PREVIOUS DIAGNOSTIC TESTING:      ECHO  FINDINGS:    STRESS TEST  FINDINGS:    CATHETERIZATION  FINDINGS:    MEDICATIONS  (STANDING):  cefepime   IVPB      cefepime   IVPB 1000 milliGRAM(s) IV Intermittent once  cefepime   IVPB 1000 milliGRAM(s) IV Intermittent every 8 hours  diltiazem Injectable 5 milliGRAM(s) IV Push once  diltiazem Injectable 5 milliGRAM(s) IV Push once  fentaNYL   Patch  50 MICROgram(s)/Hr. 1 Patch Transdermal every 48 hours  gabapentin 600 milliGRAM(s) Oral three times a day  LORazepam     Tablet 1 milliGRAM(s) Oral once  LORazepam   Injectable 2 milliGRAM(s) IV Push once  methylPREDNISolone sodium succinate Injectable 125 milliGRAM(s) IV Push two times a day  oxybutynin 5 milliGRAM(s) Oral two times a day  pantoprazole  Injectable 40 milliGRAM(s) IV Push daily  vancomycin  IVPB 1000 milliGRAM(s) IV Intermittent every 12 hours    MEDICATIONS  (PRN):  fentaNYL    Injectable 25 MICROGram(s) IV Push every 12 hours PRN Withdrawal symptoms      FAMILY HISTORY:  Family history of pancreatic cancer (Grandparent)  Family history of lung cancer (Grandparent)      SOCIAL HISTORY:  CIGARETTES:  ALCOHOL:  DRUGS:                      REVIEW OF SYSTEMS:  CONSTITUTIONAL: No distress, Looks stable  NECK: No pain or stiffnes  RESPIRATORY: No cough, wheezing, shortness of breath  CARDIOVASCULAR: No chest pain, SOB, palpitations, leg swelling  GASTROINTESTINAL: No abdominal or epigastric pain. No nausea, vomiting, or hematemesis;  No melena.  NEUROLOGICAL: No dizziness, headaches, memory loss, loss of strength  SKIN: No itching, burning, rashes, or lesions   ENDOCRINE: No heat or cold intolerance  MUSCULOSKELETAL: No joint pain, No  swelling; No muscle pain  PSYCHIATRIC: No depression, anxiety, mood swings, or difficulty sleeping  ALLERGY: No hives, itching, rash          Vital Signs Last 24 Hrs  T(C): 38.6 (13 Aug 2018 12:00), Max: 38.9 (13 Aug 2018 08:00)  T(F): 101.5 (13 Aug 2018 12:00), Max: 102.1 (13 Aug 2018 08:00)  HR: 124 (13 Aug 2018 20:15) (114 - 146)  BP: 111/59 (13 Aug 2018 15:00) (103/57 - 164/71)  BP(mean): 79 (13 Aug 2018 15:00) (68 - 110)  RR: 31 (13 Aug 2018 15:00) (28 - 64)  SpO2: 92% (13 Aug 2018 20:15) (86% - 98%)                      PHYSICAL EXAM:  GENERAL: No distress, well developed  HEAD:  Atraumatic, Normocephalic  NECK: Supple, No JVD, No Bruit of either carotid arteries  NERVOUS SYSTEM:  Alert, Awake, Oriented to time, place, person; Normal memory and speech; Normal motor Strength 5/5 B/L upper and lower extremities  CHEST/LUNG: Normal air entry to lung base bilaterally; No wheeze, crackle, rales, rhonchi  HEART: Regular heart beat, S1, A2, P2, No S3, No S4, No gallop, No murmur  ABDOMEN: Soft, Non tender, Non distended; Bowel sounds present  EXTREMITIES:  2+ Peripheral Pulses, No clubbing, No edema  SKIN: No rashes or lesions    TELEMETRY:    ECG:    I&O's Detail    12 Aug 2018 07:01  -  13 Aug 2018 07:00  --------------------------------------------------------  IN:    Sodium Chloride 0.9% IV Bolus: 1000 mL  Total IN: 1000 mL    OUT:    Indwelling Catheter - Urethral: 530 mL  Total OUT: 530 mL    Total NET: 470 mL      13 Aug 2018 07:01  -  13 Aug 2018 21:04  --------------------------------------------------------  IN:  Total IN: 0 mL    OUT:    Indwelling Catheter - Urethral: 740 mL  Total OUT: 740 mL    Total NET: -740 mL          LABS:                        5.6    9.28  )-----------( 69       ( 13 Aug 2018 04:30 )             16.4     08-13    147<H>  |  106  |  28<H>  ----------------------------<  127<H>  3.5   |  28  |  0.9    Ca    8.1<L>      13 Aug 2018 04:30  Phos  3.1     08-13  Mg     2.2     08-12    TPro  5.2<L>  /  Alb  3.5  /  TBili  7.7<H>  /  DBili  0.4<H>  /  AST  40  /  ALT  19  /  AlkPhos  56  08-13    CARDIAC MARKERS ( 13 Aug 2018 04:30 )  x     / 0.03 ng/mL / 194 U/L / x     / 1.8 ng/mL  CARDIAC MARKERS ( 12 Aug 2018 20:41 )  x     / 0.02 ng/mL / x     / x     / 1.7 ng/mL  CARDIAC MARKERS ( 12 Aug 2018 20:19 )  x     / x     / 120 U/L / x     / x          PT/INR - ( 12 Aug 2018 14:22 )   PT: 15.20 sec;   INR: 1.41 ratio         PTT - ( 12 Aug 2018 14:22 )  PTT:27.6 sec    I&O's Summary    12 Aug 2018 07:01  -  13 Aug 2018 07:00  --------------------------------------------------------  IN: 1000 mL / OUT: 530 mL / NET: 470 mL    13 Aug 2018 07:01  -  13 Aug 2018 21:04  --------------------------------------------------------  IN: 0 mL / OUT: 740 mL / NET: -740 mL        RADIOLOGY & ADDITIONAL STUDIES: Patient is a 68y old  Female who presents with a chief complaint of Acute Hemolytic Anemia/ TTP (12 Aug 2018 18:53)      HPI:  Patient is a 67 yo F with PMHx of Metastatic Hormone receptor positive, HER 2 tuyet negative breast cancer with known mets to the spine, Lymph nodes, left breast mass, and stomach follows with Dr. Brink for cancer treatment presenting with acute change in mental status admitted for acute hemolytic anemia. Per  and son and outpatient documentation, patient underwent an ultrasound with punch biopsy which showed evidence of disease progression. Patient followed was recently started on fourth line therapy known as Abemaciclib 150mg BID in combination with Fulvestrant. Per family, patient took her first dose on Friday and Saturday PTA. On Friday, they noted that patient had faint yellowing of skin with some anxiety. By Saturday she was noted to have altered mental status, mainly incoherent, repeating her words, nonsensical. They were concerned about her abrupt change in mentation so she was brought to the hospital for further evaluation. Per son, patient otherwise with no fever, occasional chills, no cough, wheezing, no diarrhea, no chest pain or SOB.     In the ED, patient with BP of 156/79, , RR 18, Temp 97.2F sat 94% on RA. She was found to have a hemoglobin of 6.2 (baseline as of May 2018 was 13.1). Platelet count of 83 (noted to be at 180 in May 2018). Peripheral smear shows evidence of schistocytes. Total Bilirubin was elevated at 10.7 with an indirect Bilirubin of 10.2. LDH elevated at 1057. (12 Aug 2018 18:53)      PAST MEDICAL & SURGICAL HISTORY:  History of tonsillectomy  History of fracture of femur      PREVIOUS DIAGNOSTIC TESTING:      ECHO preliminary , hyperdynamic lv, dilated rv, elevated rvsp, pleural effusion, no significant pericardial effusion.    FINDINGS:      MEDICATIONS  (STANDING):  cefepime   IVPB      cefepime   IVPB 1000 milliGRAM(s) IV Intermittent once  cefepime   IVPB 1000 milliGRAM(s) IV Intermittent every 8 hours  diltiazem Injectable 5 milliGRAM(s) IV Push once  diltiazem Injectable 5 milliGRAM(s) IV Push once  fentaNYL   Patch  50 MICROgram(s)/Hr. 1 Patch Transdermal every 48 hours  gabapentin 600 milliGRAM(s) Oral three times a day  LORazepam     Tablet 1 milliGRAM(s) Oral once  LORazepam   Injectable 2 milliGRAM(s) IV Push once  methylPREDNISolone sodium succinate Injectable 125 milliGRAM(s) IV Push two times a day  oxybutynin 5 milliGRAM(s) Oral two times a day  pantoprazole  Injectable 40 milliGRAM(s) IV Push daily  vancomycin  IVPB 1000 milliGRAM(s) IV Intermittent every 12 hours    MEDICATIONS  (PRN):  fentaNYL    Injectable 25 MICROGram(s) IV Push every 12 hours PRN Withdrawal symptoms      FAMILY HISTORY:  Family history of pancreatic cancer (Grandparent)  Family history of lung cancer (Grandparent)      SOCIAL HISTORY:  CIGARETTES: neg  ALCOHOL:neg  DRUGS:neg                      REVIEW OF SYSTEMS:  CONSTITUTIONAL: agitated        Vital Signs Last 24 Hrs  T(C): 38.6 (13 Aug 2018 12:00), Max: 38.9 (13 Aug 2018 08:00)  T(F): 101.5 (13 Aug 2018 12:00), Max: 102.1 (13 Aug 2018 08:00)  HR: 124 (13 Aug 2018 20:15) (114 - 146)  BP: 111/59 (13 Aug 2018 15:00) (103/57 - 164/71)  BP(mean): 79 (13 Aug 2018 15:00) (68 - 110)  RR: 31 (13 Aug 2018 15:00) (28 - 64)  SpO2: 92% (13 Aug 2018 20:15) (86% - 98%)                      PHYSICAL EXAM:  GENERAL: agitated  HEAD:  Atraumatic, Normocephalic, icteric  NECK: Supple, No JVD, No Bruit of either carotid arteries  NERVOUS SYSTEM:  agitated,  moves all 4 extremities  CHEST/LUNG: decreased bs bilaterally  HEART: rapid Regular heart beat, NS1, S2No S3, No S4, No gallop, No murmur  ABDOMEN: Soft, Non tender, Non distended; Bowel sounds present  EXTREMITIES:  2+ Peripheral Pulses, No clubbing, No edema  SKIN: icteric    TELEMETRY:st    ECG: st rbbb, prolong qtc  CXRAY pleral effusions at bases    I&O's Detail    12 Aug 2018 07:01  -  13 Aug 2018 07:00  --------------------------------------------------------  IN:    Sodium Chloride 0.9% IV Bolus: 1000 mL  Total IN: 1000 mL    OUT:    Indwelling Catheter - Urethral: 530 mL  Total OUT: 530 mL    Total NET: 470 mL      13 Aug 2018 07:01  -  13 Aug 2018 21:04  --------------------------------------------------------  IN:  Total IN: 0 mL    OUT:    Indwelling Catheter - Urethral: 740 mL  Total OUT: 740 mL    Total NET: -740 mL          LABS:                        5.6    9.28  )-----------( 69       ( 13 Aug 2018 04:30 )             16.4     08-13    147<H>  |  106  |  28<H>  ----------------------------<  127<H>  3.5   |  28  |  0.9    Ca    8.1<L>      13 Aug 2018 04:30  Phos  3.1     08-13  Mg     2.2     08-12    TPro  5.2<L>  /  Alb  3.5  /  TBili  7.7<H>  /  DBili  0.4<H>  /  AST  40  /  ALT  19  /  AlkPhos  56  08-13    CARDIAC MARKERS ( 13 Aug 2018 04:30 )  x     / 0.03 ng/mL / 194 U/L / x     / 1.8 ng/mL  CARDIAC MARKERS ( 12 Aug 2018 20:41 )  x     / 0.02 ng/mL / x     / x     / 1.7 ng/mL  CARDIAC MARKERS ( 12 Aug 2018 20:19 )  x     / x     / 120 U/L / x     / x          PT/INR - ( 12 Aug 2018 14:22 )   PT: 15.20 sec;   INR: 1.41 ratio         PTT - ( 12 Aug 2018 14:22 )  PTT:27.6 sec    I&O's Summary    12 Aug 2018 07:01  -  13 Aug 2018 07:00  --------------------------------------------------------  IN: 1000 mL / OUT: 530 mL / NET: 470 mL    13 Aug 2018 07:01  -  13 Aug 2018 21:04  --------------------------------------------------------  IN: 0 mL / OUT: 740 mL / NET: -740 mL        RADIOLOGY & ADDITIONAL STUDIES:

## 2018-08-14 VITALS
SYSTOLIC BLOOD PRESSURE: 150 MMHG | HEART RATE: 166 BPM | RESPIRATION RATE: 171 BRPM | DIASTOLIC BLOOD PRESSURE: 18 MMHG | OXYGEN SATURATION: 100 %

## 2018-08-14 DIAGNOSIS — D59.9 ACQUIRED HEMOLYTIC ANEMIA, UNSPECIFIED: ICD-10-CM

## 2018-08-14 LAB
ALBUMIN SERPL ELPH-MCNC: 3.8 G/DL — SIGNIFICANT CHANGE UP (ref 3.5–5.2)
ALP SERPL-CCNC: 57 U/L — SIGNIFICANT CHANGE UP (ref 30–115)
ALT FLD-CCNC: 24 U/L — SIGNIFICANT CHANGE UP (ref 0–41)
ANION GAP SERPL CALC-SCNC: 39 MMOL/L — HIGH (ref 7–14)
ANION GAP SERPL CALC-SCNC: 39 MMOL/L — HIGH (ref 7–14)
AST SERPL-CCNC: 77 U/L — HIGH (ref 0–41)
BASOPHILS # BLD AUTO: 0.11 K/UL — SIGNIFICANT CHANGE UP (ref 0–0.2)
BASOPHILS # BLD AUTO: 0.17 K/UL — SIGNIFICANT CHANGE UP (ref 0–0.2)
BASOPHILS NFR BLD AUTO: 0.5 % — SIGNIFICANT CHANGE UP (ref 0–1)
BASOPHILS NFR BLD AUTO: 1.1 % — HIGH (ref 0–1)
BILIRUB SERPL-MCNC: 11.1 MG/DL — HIGH (ref 0.2–1.2)
BUN SERPL-MCNC: 37 MG/DL — HIGH (ref 10–20)
BUN SERPL-MCNC: 39 MG/DL — HIGH (ref 10–20)
CALCIUM SERPL-MCNC: 6.6 MG/DL — LOW (ref 8.5–10.1)
CALCIUM SERPL-MCNC: 7.9 MG/DL — LOW (ref 8.5–10.1)
CHLORIDE SERPL-SCNC: 100 MMOL/L — SIGNIFICANT CHANGE UP (ref 98–110)
CHLORIDE SERPL-SCNC: 101 MMOL/L — SIGNIFICANT CHANGE UP (ref 98–110)
CO2 SERPL-SCNC: 15 MMOL/L — LOW (ref 17–32)
CO2 SERPL-SCNC: 18 MMOL/L — SIGNIFICANT CHANGE UP (ref 17–32)
CREAT SERPL-MCNC: 1.6 MG/DL — HIGH (ref 0.7–1.5)
CREAT SERPL-MCNC: 1.8 MG/DL — HIGH (ref 0.7–1.5)
EOSINOPHIL # BLD AUTO: 0.05 K/UL — SIGNIFICANT CHANGE UP (ref 0–0.7)
EOSINOPHIL # BLD AUTO: 0.08 K/UL — SIGNIFICANT CHANGE UP (ref 0–0.7)
EOSINOPHIL NFR BLD AUTO: 0.2 % — SIGNIFICANT CHANGE UP (ref 0–8)
EOSINOPHIL NFR BLD AUTO: 0.5 % — SIGNIFICANT CHANGE UP (ref 0–8)
FSP PPP-MCNC: >=20 UG/ML
GLUCOSE SERPL-MCNC: 235 MG/DL — HIGH (ref 70–99)
GLUCOSE SERPL-MCNC: 286 MG/DL — HIGH (ref 70–99)
HCT VFR BLD CALC: 24.3 % — LOW (ref 37–47)
HCT VFR BLD CALC: 32.8 % — LOW (ref 37–47)
HGB BLD-MCNC: 11 G/DL — LOW (ref 12–16)
HGB BLD-MCNC: 8.2 G/DL — LOW (ref 12–16)
IMM GRANULOCYTES NFR BLD AUTO: 3.2 % — HIGH (ref 0.1–0.3)
IMM GRANULOCYTES NFR BLD AUTO: 3.3 % — HIGH (ref 0.1–0.3)
LACTATE SERPL-SCNC: 22.6 MMOL/L — CRITICAL HIGH (ref 0.5–2.2)
LACTATE SERPL-SCNC: 25.1 MMOL/L — CRITICAL HIGH (ref 0.5–2.2)
LYMPHOCYTES # BLD AUTO: 10.7 % — LOW (ref 20.5–51.1)
LYMPHOCYTES # BLD AUTO: 2.23 K/UL — SIGNIFICANT CHANGE UP (ref 1.2–3.4)
LYMPHOCYTES # BLD AUTO: 20.1 % — LOW (ref 20.5–51.1)
LYMPHOCYTES # BLD AUTO: 3.21 K/UL — SIGNIFICANT CHANGE UP (ref 1.2–3.4)
MAGNESIUM SERPL-MCNC: 2.4 MG/DL — SIGNIFICANT CHANGE UP (ref 1.8–2.4)
MCHC RBC-ENTMCNC: 32.3 PG — HIGH (ref 27–31)
MCHC RBC-ENTMCNC: 32.3 PG — HIGH (ref 27–31)
MCHC RBC-ENTMCNC: 33.5 G/DL — SIGNIFICANT CHANGE UP (ref 32–37)
MCHC RBC-ENTMCNC: 33.7 G/DL — SIGNIFICANT CHANGE UP (ref 32–37)
MCV RBC AUTO: 95.7 FL — SIGNIFICANT CHANGE UP (ref 81–99)
MCV RBC AUTO: 96.2 FL — SIGNIFICANT CHANGE UP (ref 81–99)
MONOCYTES # BLD AUTO: 0.6 K/UL — SIGNIFICANT CHANGE UP (ref 0.1–0.6)
MONOCYTES # BLD AUTO: 1.22 K/UL — HIGH (ref 0.1–0.6)
MONOCYTES NFR BLD AUTO: 2.9 % — SIGNIFICANT CHANGE UP (ref 1.7–9.3)
MONOCYTES NFR BLD AUTO: 7.6 % — SIGNIFICANT CHANGE UP (ref 1.7–9.3)
NEUTROPHILS # BLD AUTO: 10.79 K/UL — HIGH (ref 1.4–6.5)
NEUTROPHILS # BLD AUTO: 17.26 K/UL — HIGH (ref 1.4–6.5)
NEUTROPHILS NFR BLD AUTO: 67.4 % — SIGNIFICANT CHANGE UP (ref 42.2–75.2)
NEUTROPHILS NFR BLD AUTO: 82.5 % — HIGH (ref 42.2–75.2)
NRBC # BLD: 16 /100 WBCS — HIGH (ref 0–0)
NRBC # BLD: 28 /100 WBCS — HIGH (ref 0–0)
PHOSPHATE SERPL-MCNC: 8.3 MG/DL — HIGH (ref 2.1–4.9)
PLATELET # BLD AUTO: 51 K/UL — LOW (ref 130–400)
PLATELET # BLD AUTO: 82 K/UL — LOW (ref 130–400)
POTASSIUM SERPL-MCNC: 3.5 MMOL/L — SIGNIFICANT CHANGE UP (ref 3.5–5)
POTASSIUM SERPL-MCNC: 3.9 MMOL/L — SIGNIFICANT CHANGE UP (ref 3.5–5)
POTASSIUM SERPL-SCNC: 3.5 MMOL/L — SIGNIFICANT CHANGE UP (ref 3.5–5)
POTASSIUM SERPL-SCNC: 3.9 MMOL/L — SIGNIFICANT CHANGE UP (ref 3.5–5)
PROT SERPL-MCNC: 5.9 G/DL — LOW (ref 6–8)
RBC # BLD: 2.54 M/UL — LOW (ref 4.2–5.4)
RBC # BLD: 3.41 M/UL — LOW (ref 4.2–5.4)
RBC # FLD: 20.3 % — HIGH (ref 11.5–14.5)
RBC # FLD: 20.3 % — HIGH (ref 11.5–14.5)
SODIUM SERPL-SCNC: 154 MMOL/L — HIGH (ref 135–146)
SODIUM SERPL-SCNC: 158 MMOL/L — HIGH (ref 135–146)
WBC # BLD: 15.99 K/UL — HIGH (ref 4.8–10.8)
WBC # BLD: 20.93 K/UL — HIGH (ref 4.8–10.8)
WBC # FLD AUTO: 15.99 K/UL — HIGH (ref 4.8–10.8)
WBC # FLD AUTO: 20.93 K/UL — HIGH (ref 4.8–10.8)

## 2018-08-14 RX ORDER — NOREPINEPHRINE BITARTRATE/D5W 8 MG/250ML
0.05 PLASTIC BAG, INJECTION (ML) INTRAVENOUS
Qty: 16 | Refills: 0 | Status: DISCONTINUED | OUTPATIENT
Start: 2018-08-14 | End: 2018-08-14

## 2018-08-14 RX ORDER — PHENYLEPHRINE HYDROCHLORIDE 10 MG/ML
0.43 INJECTION INTRAVENOUS
Qty: 160 | Refills: 0 | Status: DISCONTINUED | OUTPATIENT
Start: 2018-08-14 | End: 2018-08-14

## 2018-08-14 NOTE — CHART NOTE - NSCHARTNOTEFT_GEN_A_CORE
Description of Code:  I responded to overhead code blue page. CPR was initiated and conducted as per ACLS protocol. This was the 3rd time this patient had coded, with ROSC 2 prior times.     Initial Rhythm:  Bradycardia followed by asystole  Total duration of CPR: See RN sheet    Drugs:  – Epinephrine x 3 Amio X1  – alcium chloride (gluconate unavailable at time)   – Sodium bicarb for treatment of acidosis  – Fluids  – Atropine   – Pressors initiated   - See RN note for full detail.     Intubation: Pt was intubated prior to this code   Lines Placed: Pt had adequate central venous access working adequately.   Debrief with team - Yes, no other reversible causes identified at time.    Result of Code: Death - Time of death 0105.   Next of kin notified: Yes  Primary attending notified: Yes

## 2018-08-16 ENCOUNTER — APPOINTMENT (OUTPATIENT)
Dept: INFUSION THERAPY | Facility: CLINIC | Age: 68
End: 2018-08-16

## 2018-08-16 LAB — ADAMTS13 ACTIVITY: 84.5 % — SIGNIFICANT CHANGE UP

## 2018-08-19 LAB
CULTURE RESULTS: SIGNIFICANT CHANGE UP
SPECIMEN SOURCE: SIGNIFICANT CHANGE UP

## 2018-08-30 ENCOUNTER — APPOINTMENT (OUTPATIENT)
Dept: HEMATOLOGY ONCOLOGY | Facility: CLINIC | Age: 68
End: 2018-08-30

## 2018-08-30 ENCOUNTER — APPOINTMENT (OUTPATIENT)
Dept: INFUSION THERAPY | Facility: CLINIC | Age: 68
End: 2018-08-30

## 2018-09-27 ENCOUNTER — APPOINTMENT (OUTPATIENT)
Dept: HEMATOLOGY ONCOLOGY | Facility: CLINIC | Age: 68
End: 2018-09-27

## 2018-09-27 ENCOUNTER — APPOINTMENT (OUTPATIENT)
Dept: INFUSION THERAPY | Facility: CLINIC | Age: 68
End: 2018-09-27

## 2018-10-25 ENCOUNTER — APPOINTMENT (OUTPATIENT)
Dept: INFUSION THERAPY | Facility: CLINIC | Age: 68
End: 2018-10-25

## 2018-10-25 ENCOUNTER — APPOINTMENT (OUTPATIENT)
Dept: HEMATOLOGY ONCOLOGY | Facility: CLINIC | Age: 68
End: 2018-10-25

## 2018-11-20 ENCOUNTER — APPOINTMENT (OUTPATIENT)
Dept: HEMATOLOGY ONCOLOGY | Facility: CLINIC | Age: 68
End: 2018-11-20

## 2018-11-20 ENCOUNTER — APPOINTMENT (OUTPATIENT)
Dept: INFUSION THERAPY | Facility: CLINIC | Age: 68
End: 2018-11-20

## 2018-12-20 ENCOUNTER — APPOINTMENT (OUTPATIENT)
Dept: HEMATOLOGY ONCOLOGY | Facility: CLINIC | Age: 68
End: 2018-12-20

## 2018-12-20 ENCOUNTER — APPOINTMENT (OUTPATIENT)
Dept: INFUSION THERAPY | Facility: CLINIC | Age: 68
End: 2018-12-20

## 2021-09-15 NOTE — PATIENT PROFILE ADULT. - PRESSURE ULCER(S)
Called patient with final pathology from surgery as noted below. She understood pathology was negative    Pathologic Diagnosis   A.   Uterus, cervix and left fallopian tube, hysterectomy and left salpingectomy:  -Cervix with nabothian cysts, negative for dysplasia.  -Secretory endometrium with early stromal breakdown.  -Adenomyosis.  -Benign fallopian tube.     B.   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  -Benign fallopian tube.        no

## 2024-04-09 NOTE — ED ADULT NURSE NOTE - ISOLATION TYPE:
Called to convey below results and recommendations to patient who verbalized understanding. Patient has no further questions or concerns at this time. Writer confirmed upcoming follow up appointment date and time with patient.   None